# Patient Record
Sex: MALE | Race: BLACK OR AFRICAN AMERICAN | Employment: OTHER | URBAN - METROPOLITAN AREA
[De-identification: names, ages, dates, MRNs, and addresses within clinical notes are randomized per-mention and may not be internally consistent; named-entity substitution may affect disease eponyms.]

---

## 2017-01-10 ENCOUNTER — OFFICE VISIT (OUTPATIENT)
Dept: BEHAVIORAL/MENTAL HEALTH CLINIC | Age: 62
End: 2017-01-10

## 2017-01-10 VITALS
WEIGHT: 225 LBS | HEART RATE: 69 BPM | SYSTOLIC BLOOD PRESSURE: 161 MMHG | DIASTOLIC BLOOD PRESSURE: 73 MMHG | HEIGHT: 74 IN | BODY MASS INDEX: 28.88 KG/M2

## 2017-01-10 DIAGNOSIS — F98.8 ADD (ATTENTION DEFICIT DISORDER): Primary | Chronic | ICD-10-CM

## 2017-01-10 RX ORDER — ATOMOXETINE 40 MG/1
40 CAPSULE ORAL DAILY
Qty: 30 CAP | Refills: 1 | Status: SHIPPED | OUTPATIENT
Start: 2017-01-10 | End: 2017-02-07 | Stop reason: SDUPTHER

## 2017-01-10 NOTE — MR AVS SNAPSHOT
Visit Information Date & Time Provider Department Dept. Phone Encounter #  
 1/10/2017  4:00 PM Kang Macias, [facility] [phone] [id number] Upcoming Health Maintenance Date Due Hepatitis C Screening 1955 LIPID PANEL Q1 1955 FOOT EXAM Q1 9/29/1966 MICROALBUMIN Q1 9/29/1966 EYE EXAM RETINAL OR DILATED Q1 9/29/1966 Pneumococcal 19-64 Medium Risk (1 of 1 - PPSV23) 9/29/1975 DTaP/Tdap/Td series (1 - Tdap) 9/29/1977 FOBT Q 1 YEAR AGE 50-75 9/29/2006 HEMOGLOBIN A1C Q6M 1/27/2016 INFLUENZA AGE 9 TO ADULT 8/1/2016 ZOSTER VACCINE AGE 60> 9/29/2016 Allergies as of 1/10/2017  Review Complete On: 1/10/2017 By: Ileene Schwab No Known Allergies Current Immunizations  Never Reviewed No immunizations on file. Not reviewed this visit You Were Diagnosed With   
  
 Codes Comments ADD (attention deficit disorder)    -  Primary ICD-10-CM: F98.8 ICD-9-CM: 314.00 Vitals BP Pulse Height(growth percentile) Weight(growth percentile) BMI Smoking Status 161/73 (BP 1 Location: Left arm) 69 6' 2\" (1.88 m) 225 lb (102.1 kg) 28.89 kg/m2 Never Smoker Vitals History BMI and BSA Data Body Mass Index Body Surface Area  
 28.89 kg/m 2 2.31 m 2 Preferred Pharmacy Pharmacy Name Phone Aravind [address], [address] AT [facility] OF  [facility] [phone] Your Updated Medication List  
  
   
This list is accurate as of: 1/10/17  4:09 PM.  Always use your most recent med list.  
  
  
  
  
 ACCU-CHEK LIANNE PLUS TEST STRP strip Generic drug:  glucose blood VI test strips  
  
 atomoxetine 40 mg capsule Commonly known as:  STRATTERA Take 1 Cap by mouth daily. atorvastatin 10 mg tablet Commonly known as:  LIPITOR HYDROcodone-acetaminophen 5-325 mg per tablet Commonly known as:  Yenny Mura  
 Take 1 Tab by mouth every six (6) hours as needed for Pain. Max Daily Amount: 4 Tabs. ibuprofen 600 mg tablet Commonly known as:  MOTRIN Take 1 Tab by mouth every eight (8) hours. insulin aspart 100 unit/mL injection Commonly known as:  NOVOLOG  
4 Units by SubCUTAneous route Before breakfast, lunch, and dinner. insulin glargine 300 unit/mL (1.5 mL) Inpn 20 Units by SubCUTAneous route nightly. lisinopril 10 mg tablet Commonly known as:  PRINIVIL, ZESTRIL  
20 mg.  
  
  
  
  
Prescriptions Sent to Pharmacy Refills  
 atomoxetine (STRATTERA) 40 mg capsule 1 Sig: Take 1 Cap by mouth daily. Class: Normal  
 [facility] [address] [place] [address] #: [phone] Route: Oral  
  
Introducing [facility] & HEALTH SERVICES! Sheridan Fitzpatrick introduces [employer] patient portal. Now you can access parts of your medical record, email your doctor's office, and request medication refills online. 1. In your internet browser, go to [email or web address] 2. Click on the First Time User? Click Here link in the Sign In box. You will see the New Member Sign Up page. 3. Enter your [employer] Access Code exactly as it appears below. You will not need to use this code after youve completed the sign-up process. If you do not sign up before the expiration date, you must request a new code. · [employer] Access Code: [email or web address] Expires: 3/29/2017  4:57 PM 
 
4. Enter the last four digits of your Social Security Number (xxxx) and Date of Birth (mm/dd/yyyy) as indicated and click Submit. You will be taken to the next sign-up page. 5. Create a ALLO Communicationst ID. This will be your [employer] login ID and cannot be changed, so think of one that is secure and easy to remember. 6. Create a [employer] password. You can change your password at any time. 7. Enter your Password Reset Question and Answer.  This can be used at a later time if you forget your password. 8. Enter your e-mail address. You will receive e-mail notification when new information is available in [address] 9. Click Sign Up. You can now view and download portions of your medical record. 10. Click the Download Summary menu link to download a portable copy of your medical information. If you have questions, please visit the Frequently Asked Questions section of the [employer] website. Remember, [employer] is NOT to be used for urgent needs. For medical emergencies, dial 911. Now available from your iPhone and Android! Please provide this summary of care documentation to your next provider. Your primary care clinician is listed as [facility]. If you have any questions after today's visit, please call [phone].

## 2017-01-12 NOTE — PROGRESS NOTES
Psychiatric Outpatient Progress Note    Date: 1/11/2017  Account Number:  [id number]  Name: Leonette Essex    Length of psychotherapy session: 20 minutes       SUBJECTIVE:   Leonette Essex  is a 62 y.o.  male  patient presents for a therapy/psychopharmacological management appointment. Pt reports stable mood overall but states he does not feel like concentration is better. Patient denies SI/HI/SIB. No evidence of AH/VH or delusions. ROS:   Appetite:good , Sleep: improved     Response to Treatment:  restarting  Side Effects: none      Supportive/Cognitive/Reality-Oriented psychotherapy provided in regards to psychosocial stressors:   Current problems   Occupational issues   Medical issues   Interpersonal conflicts  Psychoeducation provided  Treatment plan reviewed with patient-including diagnosis and medications    OBJECTIVE:                 Mental Status exam: WNL except for      Sensorium  oriented to time, place and person   Relations cooperative    Eye Contact    appropriate   Appearance:  casually dressed, on crutches wearing ortho boot and splint   Motor Behavior:  amb with crutches   Speech:  normal pitch and normal volume   Thought Process: goal directed   Thought Content free of delusions and free of hallucinations   Suicidal ideations none   Homicidal ideations none   Mood:  euthymic   Affect:  euthymic   Memory recent  adequate   Memory remote:  adequate   Concentration:  adequate   Abstraction:  abstract   Insight:  good   Reliability good   Judgment:  good       No Known Allergies     Current Outpatient Prescriptions   Medication Sig Dispense Refill    atomoxetine (STRATTERA) 40 mg capsule Take 1 Cap by mouth daily. 30 Cap 1    HYDROcodone-acetaminophen (NORCO) 5-325 mg per tablet Take 1 Tab by mouth every six (6) hours as needed for Pain. Max Daily Amount: 4 Tabs. 17 Tab 0    ibuprofen (MOTRIN) 600 mg tablet Take 1 Tab by mouth every eight (8) hours.  20 Tab 0    insulin aspart (NOVOLOG) 100 unit/mL injection 4 Units by SubCUTAneous route Before breakfast, lunch, and dinner.  insulin glargine 300 unit/mL (1.5 mL) inpn 20 Units by SubCUTAneous route nightly.  atorvastatin (LIPITOR) 10 mg tablet   2    lisinopril (PRINIVIL, ZESTRIL) 10 mg tablet 20 mg.  2    ACCU-CHEK LIANNE PLUS TEST STRP strip   6        MEDICAL DECISION MAKING    Data: pertinent labs, imaging, medical records and diagnostic tests reviewed and incorporated in diagnosis and treatment plan    Diagnoses/ Problems:     ICD-10-CM ICD-9-CM    1. ADD (attention deficit disorder) F98.8 314.00 atomoxetine (STRATTERA) 40 mg capsule       3. Medical:  Insulin-dependent diabetic, HTN, hypercholesteremia  4. Social:  relocation due to [address], financial, unemployment. Assessment:  Tres Winston  is a 62 y.o.  male patient presents with depression, anxiety and decreased concentration. Anxiety about wife's illness and recent hospitalization. Had own ortho injury and can not work for several weeks. Denies SI. Risk Scoring- chronic illnesses and prescription drug management    Treatment Plan:  1. Medications:      DC Vyvanse  Start Strattera    Orders Placed This Encounter    atomoxetine (STRATTERA) 40 mg capsule           The following regarding medications was addressed:    (The risks and benefits of the proposed medication; the potential medication side effects ie    dry mouth, weight gain, GI upset, headache; patient given opportunity to ask questions)       Medication Changes/Adjustments:   Medications Discontinued During This Encounter   Medication Reason    Lisdexamfetamine (VYVANSE) 40 mg capsule Alternate Therapy    Lisdexamfetamine (VYVANSE) 40 mg capsule Alternate Therapy    Lisdexamfetamine (VYVANSE) 40 mg capsule Alternate Therapy      2. Provided supportive psychotherapy: addressed safety risk, recent stressors, provided validation and assisted with coping skills and problem solving   -  3.    Follow-up Disposition:  Return in about 4 weeks (around 2/7/2017). PSYCHOTHERAPY:  approx 10 minutes  Type:  Supportive & Solution Focused psychotherapy provided  Focus:     Current problems  -work tasking, making lists   Housing issues   Occupational issues   Interpersonal conflicts  Psychoeducation provided  Treatment plan reviewed with patient-including diagnosis and medications    Torres Friend is progressing.

## 2017-02-07 ENCOUNTER — OFFICE VISIT (OUTPATIENT)
Dept: BEHAVIORAL/MENTAL HEALTH CLINIC | Age: 62
End: 2017-02-07

## 2017-02-07 DIAGNOSIS — F98.8 ADD (ATTENTION DEFICIT DISORDER): Chronic | ICD-10-CM

## 2017-02-07 RX ORDER — ATOMOXETINE 40 MG/1
40 CAPSULE ORAL DAILY
Qty: 30 CAP | Refills: 1 | Status: SHIPPED | OUTPATIENT
Start: 2017-02-07 | End: 2017-05-02 | Stop reason: SDUPTHER

## 2017-02-07 NOTE — PROGRESS NOTES
Psychiatric Outpatient Progress Note    Date: 2/20/2017  Account Number:  [id number]  Name: Ti Laguerre    Length of psychotherapy session: 20 minutes       SUBJECTIVE:   Ti Laguerre  is a 62 y.o.  male  patient presents for a therapy/psychopharmacological management appointment. Pt reports stable mood overall but had a good transition to strattera. More attentive. Able to finish tasks. Patient denies SI/HI/SIB. No evidence of AH/VH or delusions. ROS:   Appetite:good , Sleep: improved     Response to Treatment:  restarting  Side Effects: none      Supportive/Cognitive/Reality-Oriented psychotherapy provided in regards to psychosocial stressors:   Current problems   Occupational issues   Medical issues   Interpersonal conflicts  Psychoeducation provided  Treatment plan reviewed with patient-including diagnosis and medications    OBJECTIVE:                 Mental Status exam: WNL except for      Sensorium  oriented to time, place and person   Relations cooperative    Eye Contact    appropriate   Appearance:  casually dressed, on crutches wearing ortho boot and splint   Motor Behavior: In waking boot   Speech:  normal pitch and normal volume   Thought Process: goal directed   Thought Content free of delusions and free of hallucinations   Suicidal ideations none   Homicidal ideations none   Mood:  euthymic   Affect:  euthymic   Memory recent  adequate   Memory remote:  adequate   Concentration:  adequate   Abstraction:  abstract   Insight:  good   Reliability good   Judgment:  good       No Known Allergies     Current Outpatient Prescriptions   Medication Sig Dispense Refill    atomoxetine (STRATTERA) 40 mg capsule Take 1 Cap by mouth daily. 30 Cap 1    HYDROcodone-acetaminophen (NORCO) 5-325 mg per tablet Take 1 Tab by mouth every six (6) hours as needed for Pain. Max Daily Amount: 4 Tabs. 17 Tab 0    ibuprofen (MOTRIN) 600 mg tablet Take 1 Tab by mouth every eight (8) hours.  20 Tab 0    insulin aspart (NOVOLOG) 100 unit/mL injection 4 Units by SubCUTAneous route Before breakfast, lunch, and dinner.  insulin glargine 300 unit/mL (1.5 mL) inpn 20 Units by SubCUTAneous route nightly.  atorvastatin (LIPITOR) 10 mg tablet   2    lisinopril (PRINIVIL, ZESTRIL) 10 mg tablet 20 mg.  2    ACCU-CHEK LIANNE PLUS TEST STRP strip   6        MEDICAL DECISION MAKING    Data: pertinent labs, imaging, medical records and diagnostic tests reviewed and incorporated in diagnosis and treatment plan    Diagnoses/ Problems:     ICD-10-CM ICD-9-CM    1. ADD (attention deficit disorder) F98.8 314.00 atomoxetine (STRATTERA) 40 mg capsule       3. Medical:  Insulin-dependent diabetic, HTN, hypercholesteremia  4. Social:  relocation due to [address], financial, unemployment. Assessment:  Judy Ewing  is a 62 y.o.  male patient presents with depression, anxiety and decreased concentration. Anxiety about family stressor and his orthopaedic injuries. Good change from vyvanse to strattera. Better concentration and able to complete work tasks. Continues to have frustration about family not meeting his expectations. Denies SI. Risk Scoring- chronic illnesses and prescription drug management    Treatment Plan:  1. Medications:     continue Strattera    Orders Placed This Encounter    atomoxetine (STRATTERA) 40 mg capsule           The following regarding medications was addressed:    (The risks and benefits of the proposed medication; the potential medication side effects ie    dry mouth, weight gain, GI upset, headache; patient given opportunity to ask questions)       Medication Changes/Adjustments:   Medications Discontinued During This Encounter   Medication Reason    atomoxetine (STRATTERA) 40 mg capsule Reorder      2. Provided supportive psychotherapy: addressed safety risk, recent stressors, provided validation and assisted with coping skills and problem solving   -  3.    Follow-up Disposition:  Return in about 3 months (around 5/7/2017). PSYCHOTHERAPY:  approx 10 minutes  Type:  Supportive & Solution Focused psychotherapy provided  Focus:     Current problems  -work tasking, making lists   Housing issues   Occupational issues   Interpersonal conflicts  Psychoeducation provided  Treatment plan reviewed with patient-including diagnosis and medications    Refugia Locus is progressing.

## 2017-02-07 NOTE — MR AVS SNAPSHOT
Visit Information Date & Time Provider Department Dept. Phone Encounter #  
 2/7/2017  3:00 PM Kang Macias, [address] [facility] [phone] [id number] Follow-up Instructions Return in about 3 months (around 5/7/2017). Upcoming Health Maintenance Date Due Hepatitis C Screening 1955 LIPID PANEL Q1 1955 FOOT EXAM Q1 9/29/1966 MICROALBUMIN Q1 9/29/1966 EYE EXAM RETINAL OR DILATED Q1 9/29/1966 Pneumococcal 19-64 Medium Risk (1 of 1 - PPSV23) 9/29/1975 DTaP/Tdap/Td series (1 - Tdap) 9/29/1977 FOBT Q 1 YEAR AGE 50-75 9/29/2006 HEMOGLOBIN A1C Q6M 1/27/2016 INFLUENZA AGE 9 TO ADULT 8/1/2016 ZOSTER VACCINE AGE 60> 9/29/2016 Allergies as of 2/7/2017  Review Complete On: 2/7/2017 By: Kang Macias NP No Known Allergies Current Immunizations  Never Reviewed No immunizations on file. Not reviewed this visit You Were Diagnosed With   
  
 Codes Comments ADD (attention deficit disorder)     ICD-10-CM: F98.8 ICD-9-CM: 314.00 Vitals Smoking Status Never Smoker Preferred Pharmacy Pharmacy Name Phone Aravind [address], [address] AT [facility] OF  [facility] [phone] Your Updated Medication List  
  
   
This list is accurate as of: 2/7/17  3:30 PM.  Always use your most recent med list.  
  
  
  
  
 ACCU-CHEK LIANNE PLUS TEST STRP strip Generic drug:  glucose blood VI test strips  
  
 atomoxetine 40 mg capsule Commonly known as:  STRATTERA Take 1 Cap by mouth daily. atorvastatin 10 mg tablet Commonly known as:  LIPITOR HYDROcodone-acetaminophen 5-325 mg per tablet Commonly known as:  Yenny Mura Take 1 Tab by mouth every six (6) hours as needed for Pain. Max Daily Amount: 4 Tabs. ibuprofen 600 mg tablet Commonly known as:  MOTRIN Take 1 Tab by mouth every eight (8) hours. insulin aspart 100 unit/mL injection Commonly known as:  NOVOLOG  
4 Units by SubCUTAneous route Before breakfast, lunch, and dinner. insulin glargine 300 unit/mL (1.5 mL) Inpn 20 Units by SubCUTAneous route nightly. lisinopril 10 mg tablet Commonly known as:  PRINIVIL, ZESTRIL  
20 mg.  
  
  
  
  
Prescriptions Sent to Pharmacy Refills  
 atomoxetine (STRATTERA) 40 mg capsule 1 Sig: Take 1 Cap by mouth daily. Class: Normal  
 Pharmacy: [facility] [place], [address] [facility] #: [phone] Route: Oral  
  
Follow-up Instructions Return in about 3 months (around 5/7/2017). Introducing [facility] & [facility] SERVICES! Daphnie Acosta introduces [employer] patient portal. Now you can access parts of your medical record, email your doctor's office, and request medication refills online. 1. In your internet browser, go to [email or web address] 2. Click on the First Time User? Click Here link in the Sign In box. You will see the New Member Sign Up page. 3. Enter your [employer] Access Code exactly as it appears below. You will not need to use this code after youve completed the sign-up process. If you do not sign up before the expiration date, you must request a new code. · [employer] Access Code: [email or web address] Expires: 3/29/2017  4:57 PM 
 
4. Enter the last four digits of your Social Security Number (xxxx) and Date of Birth (mm/dd/yyyy) as indicated and click Submit. You will be taken to the next sign-up page. 5. Create a [employer]A Motor[employer] ID. This will be your [employer] login ID and cannot be changed, so think of one that is secure and easy to remember. 6. Create a [employer] password. You can change your password at any time. 7. Enter your Password Reset Question and Answer. This can be used at a later time if you forget your password. 8. Enter your e-mail address.  You will receive e-mail notification when new information is available in [employer]. 9. Click Sign Up. You can now view and download portions of your medical record. 10. Click the Download Summary menu link to download a portable copy of your medical information. If you have questions, please visit the Frequently Asked Questions section of the [employer] website. Remember, [employer] is NOT to be used for urgent needs. For medical emergencies, dial 911. Now available from your iPhone and Android! Please provide this summary of care documentation to your next provider. Your primary care clinician is listed as Therese Melgar. If you have any questions after today's visit, please call [phone].

## 2017-05-02 ENCOUNTER — OFFICE VISIT (OUTPATIENT)
Dept: BEHAVIORAL/MENTAL HEALTH CLINIC | Age: 62
End: 2017-05-02

## 2017-05-02 VITALS
HEIGHT: 74 IN | WEIGHT: 212 LBS | DIASTOLIC BLOOD PRESSURE: 80 MMHG | BODY MASS INDEX: 27.21 KG/M2 | SYSTOLIC BLOOD PRESSURE: 148 MMHG | HEART RATE: 84 BPM

## 2017-05-02 DIAGNOSIS — F98.8 ADD (ATTENTION DEFICIT DISORDER): Primary | Chronic | ICD-10-CM

## 2017-05-02 RX ORDER — INSULIN ASPART 100 [IU]/ML
INJECTION, SOLUTION INTRAVENOUS; SUBCUTANEOUS
Refills: 1 | COMMUNITY
Start: 2017-03-30 | End: 2022-03-10

## 2017-05-02 RX ORDER — ATOMOXETINE 60 MG/1
60 CAPSULE ORAL DAILY
Qty: 30 CAP | Refills: 2 | Status: SHIPPED | OUTPATIENT
Start: 2017-05-02 | End: 2017-07-13 | Stop reason: SDUPTHER

## 2017-05-02 NOTE — MR AVS SNAPSHOT
Visit Information Date & Time Provider Department Dept. Phone Encounter #  
 5/2/2017  3:30 PM Dmitriy Gunderson, [address] [facility] [phone] [id number] Upcoming Health Maintenance Date Due Hepatitis C Screening 1955 LIPID PANEL Q1 1955 FOOT EXAM Q1 9/29/1966 MICROALBUMIN Q1 9/29/1966 EYE EXAM RETINAL OR DILATED Q1 9/29/1966 Pneumococcal 19-64 Medium Risk (1 of 1 - PPSV23) 9/29/1975 DTaP/Tdap/Td series (1 - Tdap) 9/29/1977 FOBT Q 1 YEAR AGE 50-75 9/29/2006 HEMOGLOBIN A1C Q6M 1/27/2016 ZOSTER VACCINE AGE 60> 9/29/2016 INFLUENZA AGE 9 TO ADULT 8/1/2017 Allergies as of 5/2/2017  Review Complete On: 5/2/2017 By: Yovany Carrasco No Known Allergies Current Immunizations  Never Reviewed No immunizations on file. Not reviewed this visit You Were Diagnosed With   
  
 Codes Comments ADD (attention deficit disorder)    -  Primary ICD-10-CM: F98.8 ICD-9-CM: 314.00 Vitals BP Pulse Height(growth percentile) Weight(growth percentile) BMI Smoking Status 148/80 84 6' 2\" (1.88 m) 212 lb (96.2 kg) 27.22 kg/m2 Never Smoker Vitals History BMI and BSA Data Body Mass Index Body Surface Area  
 27.22 kg/m 2 2.24 m 2 Preferred Pharmacy Pharmacy Name Phone Aravind Billings[address], [address] AT [facility] OF  [facility] [phone] Your Updated Medication List  
  
   
This list is accurate as of: 5/2/17  3:49 PM.  Always use your most recent med list.  
  
  
  
  
 ACCU-CHEK LIANNE PLUS TEST STRP strip Generic drug:  glucose blood VI test strips  
  
 atomoxetine 60 mg capsule Commonly known as:  STRATTERA Take 1 Cap by mouth daily. atorvastatin 10 mg tablet Commonly known as:  LIPITOR HYDROcodone-acetaminophen 5-325 mg per tablet Commonly known as:  Gavino Ugarte  
 Take 1 Tab by mouth every six (6) hours as needed for Pain. Max Daily Amount: 4 Tabs. ibuprofen 600 mg tablet Commonly known as:  MOTRIN Take 1 Tab by mouth every eight (8) hours. * insulin aspart 100 unit/mL injection Commonly known as:  NOVOLOG  
4 Units by SubCUTAneous route Before breakfast, lunch, and dinner. * NovoLOG Flexpen 100 unit/mL Inpn Generic drug:  insulin aspart INJECT 4 UNITS SC FOR MEALS IF NOT TAKING HUMALOG  
  
 insulin glargine 300 unit/mL (1.5 mL) Inpn 20 Units by SubCUTAneous route nightly. lisinopril 10 mg tablet Commonly known as:  PRINIVIL, ZESTRIL  
20 mg.  
  
 * Notice: This list has 2 medication(s) that are the same as other medications prescribed for you. Read the directions carefully, and ask your doctor or other care provider to review them with you. Prescriptions Sent to Pharmacy Refills  
 atomoxetine (STRATTERA) 60 mg capsule 2 Sig: Take 1 Cap by mouth daily. Class: Normal  
 Pharmacy: [facility] [place], [address] [place] [address] #: [phone] Route: Oral  
  
Introducing [facility] & HEALTH SERVICES! [place] [employer] introduces [employer] patient portal. Now you can access parts of your medical record, email your doctor's office, and request medication refills online. 1. In your internet browser, go to [email or web address] 2. Click on the First Time User? Click Here link in the Sign In box. You will see the New Member Sign Up page. 3. Enter your [employer] Access Code exactly as it appears below. You will not need to use this code after youve completed the sign-up process. If you do not sign up before the expiration date, you must request a new code. · [employer] Access Code: [email or web address] Expires: 7/31/2017  3:49 PM 
 
4.  Enter the last four digits of your Social Security Number (xxxx) and Date of Birth (mm/dd/yyyy) as indicated and click Submit. You will be taken to the next sign-up page. 5. Create a [employer] Found! ID. This will be your [employer] login ID and cannot be changed, so think of one that is secure and easy to remember. 6. Create a [employer] Found! password. You can change your password at any time. 7. Enter your Password Reset Question and Answer. This can be used at a later time if you forget your password. 8. Enter your e-mail address. You will receive e-mail notification when new information is available in [address] 9. Click Sign Up. You can now view and download portions of your medical record. 10. Click the Download Summary menu link to download a portable copy of your medical information. If you have questions, please visit the Frequently Asked Questions section of the [employer] website. Remember, [employer] is NOT to be used for urgent needs. For medical emergencies, dial 911. Now available from your iPhone and Android! Please provide this summary of care documentation to your next provider. Your primary care clinician is listed as Gamal Morel. If you have any questions after today's visit, please call [phone].

## 2017-05-02 NOTE — PROGRESS NOTES
Psychiatric Outpatient Progress Note    Date: 5/4/2017  Account Number:  [id number]  Name: Sharath Syed    Length of psychotherapy session: 20 minutes     SUBJECTIVE:   Sharath Syed  is a 62 y.o.  male  patient presents for a therapy/psychopharmacological management appointment. Pt reports stable mood overall but had a good transition to strattera. Feels like he has decreased concentration and is forgetful at times when easily distracted. Stressors: wife home from rehab s/p surgery. Patient denies SI/HI/SIB. No evidence of AH/VH or delusions. ROS:   Appetite:good , Sleep: improved     Response to Treatment:  restarting  Side Effects: none      Supportive/Cognitive/Reality-Oriented psychotherapy provided in regards to psychosocial stressors:   Current problems   Occupational issues   Medical issues   Interpersonal conflicts  Psychoeducation provided  Treatment plan reviewed with patient-including diagnosis and medications    OBJECTIVE:                 Mental Status exam: WNL except for      Sensorium  oriented to time, place and person   Relations cooperative    Eye Contact    appropriate   Appearance:  casually dressed, on crutches wearing ortho boot and splint   Motor Behavior: In waking boot   Speech:  normal pitch and normal volume   Thought Process: goal directed   Thought Content free of delusions and free of hallucinations   Suicidal ideations none   Homicidal ideations none   Mood:  euthymic   Affect:  euthymic   Memory recent  adequate   Memory remote:  adequate   Concentration:  adequate   Abstraction:  abstract   Insight:  good   Reliability good   Judgment:  good       No Known Allergies     Current Outpatient Prescriptions   Medication Sig Dispense Refill    NOVOLOG FLEXPEN 100 unit/mL inpn INJECT 4 UNITS SC FOR MEALS IF NOT TAKING HUMALOG  1    atomoxetine (STRATTERA) 60 mg capsule Take 1 Cap by mouth daily.  30 Cap 2    insulin aspart (NOVOLOG) 100 unit/mL injection 4 Units by SubCUTAneous route Before breakfast, lunch, and dinner.  atorvastatin (LIPITOR) 10 mg tablet   2    lisinopril (PRINIVIL, ZESTRIL) 10 mg tablet 20 mg.  2    ACCU-CHEK LIANNE PLUS TEST STRP strip   6    HYDROcodone-acetaminophen (NORCO) 5-325 mg per tablet Take 1 Tab by mouth every six (6) hours as needed for Pain. Max Daily Amount: 4 Tabs. 17 Tab 0    ibuprofen (MOTRIN) 600 mg tablet Take 1 Tab by mouth every eight (8) hours. 20 Tab 0    insulin glargine 300 unit/mL (1.5 mL) inpn 20 Units by SubCUTAneous route nightly. MEDICAL DECISION MAKING    Data: pertinent labs, imaging, medical records and diagnostic tests reviewed and incorporated in diagnosis and treatment plan    Diagnoses/ Problems:     ICD-10-CM ICD-9-CM    1. ADD (attention deficit disorder) F98.8 314.00 atomoxetine (STRATTERA) 60 mg capsule       3. Medical:  Insulin-dependent diabetic, HTN, hypercholesteremia  4. Social:  relocation due to [address] [place] [address] Street, financial, unemployment. Assessment:  Antonieta Young  is a 62 y.o.  male patient presents with depression, anxiety and decreased concentration. Anxiety about family stressor and his orthopaedic injuries. Good change from vyvanse to strattera. Concentration decreased and forgetful. Continues to have frustration about family not meeting his expectations. Denies SI. Risk Scoring- chronic illnesses and prescription drug management    Treatment Plan:  1.   Medications:     Increase Strattera 60 mg    Orders Placed This Encounter    NOVOLOG FLEXPEN 100 unit/mL inpn    atomoxetine (STRATTERA) 60 mg capsule           The following regarding medications was addressed:    (The risks and benefits of the proposed medication; the potential medication side effects ie    dry mouth, weight gain, GI upset, headache; patient given opportunity to ask questions)       Medication Changes/Adjustments:   Medications Discontinued During This Encounter   Medication Reason    atomoxetine (STRATTERA) 40 mg capsule Reorder      2. Provided supportive psychotherapy: addressed safety risk, recent stressors, provided validation and assisted with coping skills and problem solving   -  3. Follow-up Disposition:  Return in about 3 months (around 8/2/2017). PSYCHOTHERAPY:  approx 10 minutes  Type:  Supportive & Solution Focused psychotherapy provided  Focus:     Current problems  -work tasking, making lists   Housing issues   Occupational issues   Interpersonal conflicts  Psychoeducation provided  Treatment plan reviewed with patient-including diagnosis and medications    Jules Number is progressing.     Signed By: Ying Lima NP     May 4, 2017

## 2017-07-13 ENCOUNTER — OFFICE VISIT (OUTPATIENT)
Dept: BEHAVIORAL/MENTAL HEALTH CLINIC | Age: 62
End: 2017-07-13

## 2017-07-13 VITALS
HEIGHT: 74 IN | HEART RATE: 81 BPM | BODY MASS INDEX: 27.08 KG/M2 | WEIGHT: 211 LBS | DIASTOLIC BLOOD PRESSURE: 91 MMHG | SYSTOLIC BLOOD PRESSURE: 159 MMHG

## 2017-07-13 DIAGNOSIS — F98.8 ADD (ATTENTION DEFICIT DISORDER): Primary | Chronic | ICD-10-CM

## 2017-07-13 RX ORDER — ATOMOXETINE 60 MG/1
60 CAPSULE ORAL DAILY
Qty: 30 CAP | Refills: 2 | Status: SHIPPED | OUTPATIENT
Start: 2017-07-13 | End: 2017-10-26 | Stop reason: SDUPTHER

## 2017-07-13 NOTE — MR AVS SNAPSHOT
Visit Information Date & Time Provider Department Dept. Phone Encounter #  
 7/13/2017  3:00 PM [place]rayan Davis, [address] [place] [phone] [id number] Upcoming Health Maintenance Date Due Hepatitis C Screening 1955 LIPID PANEL Q1 1955 FOOT EXAM Q1 9/29/1966 MICROALBUMIN Q1 9/29/1966 EYE EXAM RETINAL OR DILATED Q1 9/29/1966 Pneumococcal 19-64 Medium Risk (1 of 1 - PPSV23) 9/29/1975 DTaP/Tdap/Td series (1 - Tdap) 9/29/1977 FOBT Q 1 YEAR AGE 50-75 9/29/2006 HEMOGLOBIN A1C Q6M 1/27/2016 ZOSTER VACCINE AGE 60> 9/29/2016 INFLUENZA AGE 9 TO ADULT 8/1/2017 Allergies as of 7/13/2017  Review Complete On: 7/13/2017 By: Manda Cervantes No Known Allergies Current Immunizations  Never Reviewed No immunizations on file. Not reviewed this visit You Were Diagnosed With   
  
 Codes Comments ADD (attention deficit disorder)    -  Primary ICD-10-CM: F98.8 ICD-9-CM: 314.00 Vitals BP Pulse Height(growth percentile) Weight(growth percentile) BMI Smoking Status (!) 159/91 81 6' 2\" (1.88 m) 211 lb (95.7 kg) 27.09 kg/m2 Never Smoker Vitals History BMI and BSA Data Body Mass Index Body Surface Area  
 27.09 kg/m 2 2.24 m 2 Preferred Pharmacy Pharmacy Name Phone [facility], [address] AT [facility] OF  [facility] [phone] Your Updated Medication List  
  
   
This list is accurate as of: 7/13/17  3:20 PM.  Always use your most recent med list.  
  
  
  
  
 ACCU-CHEK LIANNE PLUS TEST STRP strip Generic drug:  glucose blood VI test strips  
  
 atomoxetine 60 mg capsule Commonly known as:  STRATTERA Take 1 Cap by mouth daily. atorvastatin 10 mg tablet Commonly known as:  LIPITOR HYDROcodone-acetaminophen 5-325 mg per tablet Commonly known as:  Ronda Hadley  
 Take 1 Tab by mouth every six (6) hours as needed for Pain. Max Daily Amount: 4 Tabs. ibuprofen 600 mg tablet Commonly known as:  MOTRIN Take 1 Tab by mouth every eight (8) hours. * insulin aspart 100 unit/mL injection Commonly known as:  NOVOLOG  
4 Units by SubCUTAneous route Before breakfast, lunch, and dinner. * NovoLOG Flexpen 100 unit/mL Inpn Generic drug:  insulin aspart INJECT 4 UNITS SC FOR MEALS IF NOT TAKING HUMALOG  
  
 insulin glargine 300 unit/mL (1.5 mL) Inpn 20 Units by SubCUTAneous route nightly. lisinopril 10 mg tablet Commonly known as:  PRINIVIL, ZESTRIL  
20 mg.  
  
 * Notice: This list has 2 medication(s) that are the same as other medications prescribed for you. Read the directions carefully, and ask your doctor or other care provider to review them with you. Prescriptions Sent to Pharmacy Refills  
 atomoxetine (STRATTERA) 60 mg capsule 2 Sig: Take 1 Cap by mouth daily. Class: Normal  
 Pharmacy: [facility], [address] [place] [address] #: [phone] Route: Oral  
  
Introducing [facility]! Nati Briscoe introduces [email or web address] patient portal. Now you can access parts of your medical record, email your doctor's office, and request medication refills online. 1. In your internet browser, go to [email or web address] 2. Click on the First Time User? Click Here link in the Sign In box. You will see the New Member Sign Up page. 3. Enter your [email or web address] Access Code exactly as it appears below. You will not need to use this code after youve completed the sign-up process. If you do not sign up before the expiration date, you must request a new code. · [email or web address] Access Code: [email or web address] Expires: 7/31/2017  3:49 PM 
 
4.  Enter the last four digits of your Social Security Number (xxxx) and Date of Birth (mm/dd/yyyy) as indicated and click Submit. You will be taken to the next sign-up page. 5. Create a [email or web address] ID. This will be your [email or web address] login ID and cannot be changed, so think of one that is secure and easy to remember. 6. Create a [email or web address] password. You can change your password at any time. 7. Enter your Password Reset Question and Answer. This can be used at a later time if you forget your password. 8. Enter your e-mail address. You will receive e-mail notification when new information is available in [address] 9. Click Sign Up. You can now view and download portions of your medical record. 10. Click the Download Summary menu link to download a portable copy of your medical information. If you have questions, please visit the Frequently Asked Questions section of the [email or web address] website. Remember, [email or web address] is NOT to be used for urgent needs. For medical emergencies, dial 911. Now available from your iPhone and Android! Please provide this summary of care documentation to your next provider. Your primary care clinician is listed as Gamal Morel. If you have any questions after today's visit, please call [phone].

## 2017-07-13 NOTE — PROGRESS NOTES
Psychiatric Outpatient Progress Note    Date: 7/13/2017  Account Number:  [id number]  Name: Hakeem Erickson    Length of psychotherapy session: 20 minutes     SUBJECTIVE:   Hakeem Erickson  is a 62 y.o.  male  patient presents for a therapy/psychopharmacological management appointment. Pt reports stable mood overall but had a good transition to strattera. Better concentration with dose increase. Stressors: hours cut at work. Patient denies SI/HI/SIB. No evidence of AH/VH or delusions. ROS:   Appetite:good , Sleep: improved     Response to Treatment:  restarting  Side Effects: none      Supportive/Cognitive/Reality-Oriented psychotherapy provided in regards to psychosocial stressors:   Current problems   Occupational issues   Medical issues   Interpersonal conflicts  Psychoeducation provided  Treatment plan reviewed with patient-including diagnosis and medications    OBJECTIVE:                 Mental Status exam: WNL except for      Sensorium  oriented to time, place and person   Relations cooperative    Eye Contact    appropriate   Appearance:  casually dressed, on crutches wearing ortho boot and splint   Motor Behavior: In waking boot   Speech:  normal pitch and normal volume   Thought Process: goal directed   Thought Content free of delusions and free of hallucinations   Suicidal ideations none   Homicidal ideations none   Mood:  euthymic   Affect:  euthymic   Memory recent  adequate   Memory remote:  adequate   Concentration:  adequate   Abstraction:  abstract   Insight:  good   Reliability good   Judgment:  good       No Known Allergies     Current Outpatient Prescriptions   Medication Sig Dispense Refill    atomoxetine (STRATTERA) 60 mg capsule Take 1 Cap by mouth daily. 30 Cap 2    NOVOLOG FLEXPEN 100 unit/mL inpn INJECT 4 UNITS SC FOR MEALS IF NOT TAKING HUMALOG  1    insulin aspart (NOVOLOG) 100 unit/mL injection 4 Units by SubCUTAneous route Before breakfast, lunch, and dinner.       insulin glargine 300 unit/mL (1.5 mL) inpn 20 Units by SubCUTAneous route nightly.  atorvastatin (LIPITOR) 10 mg tablet   2    lisinopril (PRINIVIL, ZESTRIL) 10 mg tablet 20 mg.  2    ACCU-CHEK LIANNE PLUS TEST STRP strip   6    HYDROcodone-acetaminophen (NORCO) 5-325 mg per tablet Take 1 Tab by mouth every six (6) hours as needed for Pain. Max Daily Amount: 4 Tabs. 17 Tab 0    ibuprofen (MOTRIN) 600 mg tablet Take 1 Tab by mouth every eight (8) hours. 20 Tab 0        MEDICAL DECISION MAKING    Data: pertinent labs, imaging, medical records and diagnostic tests reviewed and incorporated in diagnosis and treatment plan    Diagnoses/ Problems:     ICD-10-CM ICD-9-CM    1. ADD (attention deficit disorder) F98.8 314.00 atomoxetine (STRATTERA) 60 mg capsule       3. Medical:  Insulin-dependent diabetic, HTN, hypercholesteremia  4. Social:  relocation due to [address], financial, unemployment. Assessment:  Gage Clarke  is a 62 y.o.  male patient presents with depression, anxiety and decreased concentration. Depression and anxiety improved. Better concentration with increased strattera. Has some work stressors financially as hours have been reduced. Denies SI. Risk Scoring- chronic illnesses and prescription drug management    Treatment Plan:  1. Medications:     Continue Strattera 60 mg    Orders Placed This Encounter    atomoxetine (STRATTERA) 60 mg capsule           The following regarding medications was addressed:    (The risks and benefits of the proposed medication; the potential medication side effects ie    dry mouth, weight gain, GI upset, headache; patient given opportunity to ask questions)       Medication Changes/Adjustments:   Medications Discontinued During This Encounter   Medication Reason    atomoxetine (STRATTERA) 60 mg capsule Reorder      2.   Provided supportive psychotherapy: addressed safety risk, recent stressors, provided validation and assisted with coping skills and problem solving   -  3. Follow-up Disposition:  Return in about 3 months (around 10/13/2017). PSYCHOTHERAPY:  approx 10 minutes  Type:  Supportive & Solution Focused psychotherapy provided  Focus:     Current problems  -work tasking, making lists   Housing issues   Occupational issues   Interpersonal conflicts  Psychoeducation provided  Treatment plan reviewed with patient-including diagnosis and medications    Thomas Castro is progressing.     Signed By: Danelle Portillo NP     July 13, 2017

## 2017-10-30 ENCOUNTER — HOSPITAL ENCOUNTER (EMERGENCY)
Age: 62
Discharge: HOME | End: 2017-10-30
Attending: EMERGENCY MEDICINE
Payer: COMMERCIAL

## 2017-10-30 VITALS
OXYGEN SATURATION: 94 % | BODY MASS INDEX: 26.82 KG/M2 | RESPIRATION RATE: 18 BRPM | WEIGHT: 209 LBS | HEIGHT: 74 IN | SYSTOLIC BLOOD PRESSURE: 126 MMHG | HEART RATE: 70 BPM | TEMPERATURE: 97.9 F | DIASTOLIC BLOOD PRESSURE: 61 MMHG

## 2017-10-30 DIAGNOSIS — E16.2 HYPOGLYCEMIA: Primary | ICD-10-CM

## 2017-10-30 LAB
ALBUMIN SERPL-MCNC: 4 G/DL (ref 3.5–5)
ALBUMIN/GLOB SERPL: 1 {RATIO} (ref 1.1–2.2)
ALP SERPL-CCNC: 65 U/L (ref 45–117)
ALT SERPL-CCNC: 47 U/L (ref 12–78)
ANION GAP SERPL CALC-SCNC: 5 MMOL/L (ref 5–15)
AST SERPL-CCNC: 30 U/L (ref 15–37)
BASOPHILS # BLD: 0 K/UL (ref 0–0.1)
BASOPHILS NFR BLD: 0 % (ref 0–1)
BILIRUB SERPL-MCNC: 0.5 MG/DL (ref 0.2–1)
BUN SERPL-MCNC: 15 MG/DL (ref 6–20)
BUN/CREAT SERPL: 9 (ref 12–20)
CALCIUM SERPL-MCNC: 8.8 MG/DL (ref 8.5–10.1)
CHLORIDE SERPL-SCNC: 102 MMOL/L (ref 97–108)
CO2 SERPL-SCNC: 31 MMOL/L (ref 21–32)
CREAT SERPL-MCNC: 1.73 MG/DL (ref 0.7–1.3)
EOSINOPHIL # BLD: 0.1 K/UL (ref 0–0.4)
EOSINOPHIL NFR BLD: 1 % (ref 0–7)
ERYTHROCYTE [DISTWIDTH] IN BLOOD BY AUTOMATED COUNT: 13.6 % (ref 11.5–14.5)
GLOBULIN SER CALC-MCNC: 3.9 G/DL (ref 2–4)
GLUCOSE BLD STRIP.AUTO-MCNC: 233 MG/DL (ref 65–100)
GLUCOSE BLD STRIP.AUTO-MCNC: 51 MG/DL (ref 65–100)
GLUCOSE BLD STRIP.AUTO-MCNC: 80 MG/DL (ref 65–100)
GLUCOSE BLD STRIP.AUTO-MCNC: 99 MG/DL (ref 65–100)
GLUCOSE SERPL-MCNC: 52 MG/DL (ref 65–100)
HCT VFR BLD AUTO: 50.7 % (ref 36.6–50.3)
HGB BLD-MCNC: 17 G/DL (ref 12.1–17)
LYMPHOCYTES # BLD: 1 K/UL (ref 0.8–3.5)
LYMPHOCYTES NFR BLD: 7 % (ref 12–49)
MCH RBC QN AUTO: 29.1 PG (ref 26–34)
MCHC RBC AUTO-ENTMCNC: 33.5 G/DL (ref 30–36.5)
MCV RBC AUTO: 86.8 FL (ref 80–99)
MONOCYTES # BLD: 1.5 K/UL (ref 0–1)
MONOCYTES NFR BLD: 11 % (ref 5–13)
NEUTS SEG # BLD: 11.5 K/UL (ref 1.8–8)
NEUTS SEG NFR BLD: 81 % (ref 32–75)
PLATELET # BLD AUTO: 186 K/UL (ref 150–400)
POTASSIUM SERPL-SCNC: 3.5 MMOL/L (ref 3.5–5.1)
PROT SERPL-MCNC: 7.9 G/DL (ref 6.4–8.2)
RBC # BLD AUTO: 5.84 M/UL (ref 4.1–5.7)
SERVICE CMNT-IMP: ABNORMAL
SERVICE CMNT-IMP: ABNORMAL
SERVICE CMNT-IMP: NORMAL
SERVICE CMNT-IMP: NORMAL
SODIUM SERPL-SCNC: 138 MMOL/L (ref 136–145)
WBC # BLD AUTO: 14.1 K/UL (ref 4.1–11.1)

## 2017-10-30 PROCEDURE — 99284 EMERGENCY DEPT VISIT MOD MDM: CPT

## 2017-10-30 PROCEDURE — 82962 GLUCOSE BLOOD TEST: CPT

## 2017-10-30 PROCEDURE — 80053 COMPREHEN METABOLIC PANEL: CPT | Performed by: EMERGENCY MEDICINE

## 2017-10-30 PROCEDURE — 36415 COLL VENOUS BLD VENIPUNCTURE: CPT | Performed by: EMERGENCY MEDICINE

## 2017-10-30 PROCEDURE — 85025 COMPLETE CBC W/AUTO DIFF WBC: CPT | Performed by: EMERGENCY MEDICINE

## 2017-10-30 NOTE — ED NOTES
HYPOGLYCEMIC EPISODE DOCUMENTATION    Patient with hypoglycemic episode(s) at 0530(time) on 10/30/2017(date). BG value(s) pre-treatment 46    Was patient symptomatic?  [] yes, [x] no  Patient was treated with the following rescue medications/treatments: [] D50                [] Glucose tablets                [] Glucagon                [x] 4oz juice                [] 6oz reg soda                [] 8oz low fat milk  BG value post-treatment: 80  Once BG treated and value greater than 80mg/dl, pt was provided with the following:  [] snack  [] meal  Name of MD notified:N/A  The following orders were received: N/A

## 2017-10-30 NOTE — ED PROVIDER NOTES
Patient is a 65 y.o. male presenting with hypoglycemia. Low Blood Sugar       65year old male with IDDM, presents with low blood sugar. Wife found him altered in bed, glucose checked and it was 30, gave him orange juice and it came up. He states he has been in his usual state of health. No fevers, chest pain or sob. No n/v/d. Ate normally yesterday. Went to bed late and doesn't think he ate a night time snack. BS was 100 at dinner, gave himself 4 units of novolog at 7pm.  Gave himself his 25 units of Lantus at midnight. Normally takes 4units of Novolog with each meal.  Feels fine now. No change in medications recently. No urinary symptoms. Past Medical History:   Diagnosis Date    Diabetes [facility])        Past Surgical History:   Procedure Laterality Date    HX TONSILLECTOMY           Family History:   Problem Relation Age of Onset    Cancer Mother      Breast    Cancer Father      Colon    Kidney Disease Brother     Diabetes Paternal Aunt     Sickle Cell Anemia Maternal Grandmother        Social History     Social History    Marital status:      Spouse name: N/A    Number of children: N/A    Years of education: N/A     Occupational History    Not on file. Social History Main Topics    Smoking status: Never Smoker    Smokeless tobacco: Never Used    Alcohol use No    Drug use: No    Sexual activity: Yes     Partners: Female     Other Topics Concern    Not on file     Social History Narrative         ALLERGIES: Review of patient's allergies indicates no known allergies. Review of Systems   Constitutional: Negative for fever. HENT: Negative for congestion. Eyes: Negative for visual disturbance. Respiratory: Negative for cough and shortness of breath. Cardiovascular: Negative for chest pain. Gastrointestinal: Negative for abdominal pain, nausea and vomiting. Endocrine: Negative for polyuria. Genitourinary: Negative for dysuria.    Musculoskeletal: Negative for gait problem. Skin: Negative for rash. Neurological: Negative for headaches. Psychiatric/Behavioral: Negative for dysphoric mood. Vitals:    10/30/17 0510   BP: 131/70   Pulse: 62   Resp: 16   Temp: 96.9 °F (36.1 °C)   SpO2: 98%   Weight: 94.8 kg (209 lb)   Height: 6' 2\" (1.88 m)            Physical Exam   Constitutional: He is oriented to person, place, and time. He appears well-developed and well-nourished. No distress. HENT:   Head: Normocephalic and atraumatic. Mouth/Throat: Oropharynx is clear and moist. No oropharyngeal exudate. Eyes: Conjunctivae and EOM are normal. Pupils are equal, round, and reactive to light. Right eye exhibits no discharge. Left eye exhibits no discharge. No scleral icterus. Neck: Normal range of motion. Neck supple. No JVD present. Cardiovascular: Normal rate, regular rhythm, normal heart sounds and intact distal pulses. Exam reveals no gallop and no friction rub. No murmur heard. Pulmonary/Chest: Effort normal and breath sounds normal. No stridor. No respiratory distress. He has no wheezes. He has no rales. He exhibits no tenderness. Abdominal: Soft. Bowel sounds are normal. He exhibits no distension and no mass. There is no tenderness. There is no rebound and no guarding. Musculoskeletal: Normal range of motion. He exhibits no edema or tenderness. Neurological: He is alert and oriented to person, place, and time. He has normal reflexes. No cranial nerve deficit. He exhibits normal muscle tone. Coordination normal.   Skin: Skin is warm and dry. No rash noted. No erythema. Psychiatric: He has a normal mood and affect. His behavior is normal. Judgment and thought content normal.        MDM  ED Course       Procedures    Blood sugars coming up. Will give meal and continue to monitor. Check basic labs. Sugars staying up. Labs ok. Cr 1.7 but no old labs to compare. Copy of labs given to patient.  Encouraged him to call his pcp today to review results and get further recommendations on any medication adjustments.

## 2017-10-30 NOTE — ED NOTES
MD reviewed discharge instructions with the patient. The patient verbalized understanding. Patient ambulated out of ED with son. No complaints or concerns noted.

## 2017-10-30 NOTE — DISCHARGE INSTRUCTIONS
Learning About Low Blood Sugar (Hypoglycemia) in Diabetes  What is low blood sugar (hypoglycemia)? Hypoglycemia means that your blood sugar is low and your body (especially your brain) is not getting enough fuel. If you have diabetes, your blood sugar can go too low if you take too much of some diabetes medicines. It can also go too low if you miss a meal. And it can happen if you exercise too hard without eating enough food. Some medicines used to treat other health problems can cause low blood sugar too. What are the symptoms? Symptoms of low blood sugar can start quickly. It may take just 10 to 15 minutes. If you have had diabetes for many years, you may not realize that your blood sugar is low until it drops very low. · If your blood sugar level drops below 70 (mild low blood sugar), you may feel tired, anxious, dizzy, weak, shaky, or sweaty. You may have a fast heartbeat or blurry vision. · If your blood sugar level continues to drop (usually below 40), your behavior may change. You may feel more irritable. You may find it hard to concentrate or talk. And you may feel unsteady when you stand or walk. You may become too weak or confused to eat something with sugar to raise your blood sugar level. · If your blood sugar level drops very low (usually below 20), you may pass out (lose consciousness). Or you may have a seizure or stroke. If you have symptoms of severe low blood sugar, you need to get medical care right away. If you had a low blood sugar level during the night, you may wake up tired or with a headache. Or you may sweat so much during the night that your pajamas or sheets are damp when you wake up. How is low blood sugar treated? You can treat low blood sugar by eating or drinking something that has 15 grams of carbohydrate. These should be quick-sugar foods.  Check your blood sugar level again 15 minutes after having a quick-sugar food to make sure your level is getting back to your target range. Here are examples of quick-sugar foods that have 15 grams of carbohydrate:  · 3 to 4 glucose tablets  · 1 tube of glucose gel  · Hard candy (such as 3 Jolly Ranchers or 5 to 7 Life Savers)  · 1 tablespoon honey  · 2 tablespoons of raisins  · ½ cup to ¾ cup (4 to 6 ounces) of fruit juice or regular (not diet) soda  · 1 tablespoon of sugar  · 1 cup of fat-free milk  If you have problems with severe low blood sugar, someone else may have to give you a shot of glucagon. This is a hormone that raises blood sugar levels quickly. How can you prevent low blood sugar? You can take steps to prevent low blood sugar. · Follow your treatment plan. Take your insulin or other diabetes medicine exactly as your doctor prescribed it. Talk with your doctor if you're having low blood sugar often. Your medicine may need to be adjusted if it's causing your low blood sugar. · Check your blood sugar levels often. This helps you find early changes before an emergency happens. · Keep a quick-sugar food with you in case your blood sugar level drops low. · Eat small meals more often so that you don't get too hungry between meals. Don't skip meals. · Balance extra exercise with eating more. Check your blood sugar and learn how it changes after exercise. If your blood sugar stays at a normal level, you may not need to eat after you exercise. · Limit how much alcohol you drink. Alcohol can make low blood sugar go even lower. Don't drink alcohol if you have problems recognizing the early signs of low blood sugar. · Keep a diary of your symptoms. This helps you learn when changes in your body may signal low blood sugar. And keep track of how often you have low blood sugar, including when you last ate and what you ate. This will help you learn what causes your blood sugar to drop. · Learn about diabetes and low blood sugar.  Support groups or a diabetes education center can help you understand how medicines, diet, and exercise affect your blood sugar levels. Since low blood sugar levels can quickly become an emergency, be sure to wear medical alert jewelry, such as a medical alert bracelet. This is to let people know you have diabetes so they can get help for you. You can buy this at most drugstores. And make sure your family, friends, and coworkers know the symptoms of low blood sugar. Teach them what to do to get your sugar level up. Follow-up care is a key part of your treatment and safety. Be sure to make and go to all appointments, and call your doctor if you are having problems. It's also a good idea to know your test results and keep a list of the medicines you take. Where can you learn more? Go to http://lucio-natalya.info/. Enter Z511 in the search box to learn more about \"Learning About Low Blood Sugar (Hypoglycemia) in Diabetes. \"  Current as of: March 13, 2017  Content Version: 11.4  © 7139-9968 [employer]. Care instructions adapted under license by [employer] (which disclaims liability or warranty for this information). If you have questions about a medical condition or this instruction, always ask your healthcare professional. [address] any warranty or liability for your use of this information. We hope that we have addressed all of your medical concerns. The examination and treatment you received in the Emergency Department were for an emergent problem and were not intended as complete care. It is important that you follow up with your healthcare provider(s) for ongoing care. If your symptoms worsen or do not improve as expected, and you are unable to reach your usual health care provider(s), you should return to the Emergency Department. Today's healthcare is undergoing tremendous change, and patient satisfaction surveys are one of the many tools to assess the quality of medical care.   You may receive a survey from the [facility] organization regarding your experience in the Emergency Department. I hope that your experience has been completely positive, particularly the medical care that I provided. As such, please participate in the survey; anything less than excellent does not meet my expectations or intentions. [facility] and [facility] participate in nationally recognized quality of care measures. If your blood pressure is greater than 120/80, as reported below, we urge that you seek medical care to address the potential of high blood pressure, commonly known as hypertension. Hypertension can be hereditary or can be caused by certain medical conditions, pain, stress, or \"white coat syndrome. \"       Please make an appointment with your health care provider(s) for follow up of your Emergency Department visit. VITALS:   Patient Vitals for the past 8 hrs:   Temp Pulse Resp BP SpO2   10/30/17 0630 - - - 131/66 93 %   10/30/17 0510 96.9 °F (36.1 °C) 62 16 131/70 98 %          Thank you for allowing us to provide you with medical care today. We realize that you have many choices for your emergency care needs. Please choose us in the future for any continued health care needs.       Lynnwood Gitelman, MD    [facility], [facility].   Office: [phone]            Recent Results (from the past 24 hour(s))   GLUCOSE, POC    Collection Time: 10/30/17  5:04 AM   Result Value Ref Range    Glucose (POC) 51 (L) 65 - 100 mg/dL    Performed by Mejia Gunderson    CBC WITH AUTOMATED DIFF    Collection Time: 10/30/17  5:15 AM   Result Value Ref Range    WBC 14.1 (H) 4.1 - 11.1 K/uL    RBC 5.84 (H) 4.10 - 5.70 M/uL    HGB 17.0 12.1 - 17.0 g/dL    HCT 50.7 (H) 36.6 - 50.3 %    MCV 86.8 80.0 - 99.0 FL    MCH 29.1 26.0 - 34.0 PG    MCHC 33.5 30.0 - 36.5 g/dL    RDW 13.6 11.5 - 14.5 %    PLATELET 511 134 - 397 K/uL    NEUTROPHILS 81 (H) 32 - 75 %    LYMPHOCYTES 7 (L) 12 - 49 %    MONOCYTES 11 5 - 13 %    EOSINOPHILS 1 0 - 7 %    BASOPHILS 0 0 - 1 %    ABS. NEUTROPHILS 11.5 (H) 1.8 - 8.0 K/UL    ABS. LYMPHOCYTES 1.0 0.8 - 3.5 K/UL    ABS. MONOCYTES 1.5 (H) 0.0 - 1.0 K/UL    ABS. EOSINOPHILS 0.1 0.0 - 0.4 K/UL    ABS. BASOPHILS 0.0 0.0 - 0.1 K/UL   METABOLIC PANEL, COMPREHENSIVE    Collection Time: 10/30/17  5:15 AM   Result Value Ref Range    Sodium 138 136 - 145 mmol/L    Potassium 3.5 3.5 - 5.1 mmol/L    Chloride 102 97 - 108 mmol/L    CO2 31 21 - 32 mmol/L    Anion gap 5 5 - 15 mmol/L    Glucose 52 (L) 65 - 100 mg/dL    BUN 15 6 - 20 MG/DL    Creatinine 1.73 (H) 0.70 - 1.30 MG/DL    BUN/Creatinine ratio 9 (L) 12 - 20      GFR est AA 49 (L) >60 ml/min/1.73m2    GFR est non-AA 40 (L) >60 ml/min/1.73m2    Calcium 8.8 8.5 - 10.1 MG/DL    Bilirubin, total 0.5 0.2 - 1.0 MG/DL    ALT (SGPT) 47 12 - 78 U/L    AST (SGOT) 30 15 - 37 U/L    Alk. phosphatase 65 45 - 117 U/L    Protein, total 7.9 6.4 - 8.2 g/dL    Albumin 4.0 3.5 - 5.0 g/dL    Globulin 3.9 2.0 - 4.0 g/dL    A-G Ratio 1.0 (L) 1.1 - 2.2     GLUCOSE, POC    Collection Time: 10/30/17  5:26 AM   Result Value Ref Range    Glucose (POC) 80 65 - 100 mg/dL    Performed by Alisia Christiansen, POC    Collection Time: 10/30/17  5:45 AM   Result Value Ref Range    Glucose (POC) 99 65 - 100 mg/dL    Performed by Sriram Cunningham (PCT)        No results found.

## 2017-10-30 NOTE — ED TRIAGE NOTES
Son reports he found pt lethargic and diaphoretic in his bed. Son checked blood sugar with results or 30. Pt was given approx 8 oz of orange juice with sugar increasing to 65. Pt states he does not remember incident and son state pt kept dropping cup of juice. Upon arrival to ER blood sugar was 51. Pt was given 8 oz of orange juice immediately.

## 2017-11-13 ENCOUNTER — OFFICE VISIT (OUTPATIENT)
Dept: BEHAVIORAL/MENTAL HEALTH CLINIC | Age: 62
End: 2017-11-13

## 2017-11-13 VITALS
HEIGHT: 74 IN | DIASTOLIC BLOOD PRESSURE: 83 MMHG | SYSTOLIC BLOOD PRESSURE: 145 MMHG | HEART RATE: 73 BPM | BODY MASS INDEX: 27.08 KG/M2 | WEIGHT: 211 LBS

## 2017-11-13 DIAGNOSIS — F98.8 ATTENTION DEFICIT DISORDER, UNSPECIFIED HYPERACTIVITY PRESENCE: Primary | Chronic | ICD-10-CM

## 2017-11-13 RX ORDER — ATOMOXETINE 80 MG/1
80 CAPSULE ORAL DAILY
Qty: 30 CAP | Refills: 2 | Status: SHIPPED | OUTPATIENT
Start: 2017-11-13 | End: 2020-10-14 | Stop reason: ALTCHOICE

## 2017-11-13 NOTE — MR AVS SNAPSHOT
Visit Information Date & Time Provider Department Dept. Phone Encounter #  
 11/13/2017 10:00 AM Zara Vitale, [facility] [phone] [id number] Upcoming Health Maintenance Date Due Hepatitis C Screening 1955 LIPID PANEL Q1 1955 FOOT EXAM Q1 9/29/1966 MICROALBUMIN Q1 9/29/1966 EYE EXAM RETINAL OR DILATED Q1 9/29/1966 Pneumococcal 19-64 Medium Risk (1 of 1 - PPSV23) 9/29/1975 DTaP/Tdap/Td series (1 - Tdap) 9/29/1977 FOBT Q 1 YEAR AGE 50-75 9/29/2006 HEMOGLOBIN A1C Q6M 1/27/2016 ZOSTER VACCINE AGE 60> 7/29/2016 Influenza Age 5 to Adult 8/1/2017 Allergies as of 11/13/2017  Review Complete On: 11/13/2017 By: Zara Vitale NP No Known Allergies Current Immunizations  Never Reviewed No immunizations on file. Not reviewed this visit You Were Diagnosed With   
  
 Codes Comments Attention deficit disorder, unspecified hyperactivity presence    -  Primary ICD-10-CM: F98.8 ICD-9-CM: 314.00 Vitals BP Pulse Height(growth percentile) Weight(growth percentile) BMI Smoking Status 145/83 73 6' 2\" (1.88 m) 211 lb (95.7 kg) 27.09 kg/m2 Never Smoker Vitals History BMI and BSA Data Body Mass Index Body Surface Area  
 27.09 kg/m 2 2.24 m 2 Preferred Pharmacy Pharmacy Name Phone [facility] [address] [facility]  [facility] [phone] Your Updated Medication List  
  
   
This list is accurate as of: 11/13/17 10:25 AM.  Always use your most recent med list.  
  
  
  
  
 ACCU-CHEK LIANNE PLUS TEST STRP strip Generic drug:  glucose blood VI test strips  
  
 atomoxetine 80 mg capsule Commonly known as:  STRATTERA Take 1 Cap by mouth daily. atorvastatin 10 mg tablet Commonly known as:  LIPITOR  
  
 insulin glargine 300 unit/mL (1.5 mL) Inpn 20 Units by SubCUTAneous route nightly. lisinopril 10 mg tablet Commonly known as:  PRINIVIL, ZESTRIL  
20 mg. NovoLOG Flexpen 100 unit/mL Inpn Generic drug:  insulin aspart INJECT 4 UNITS SC FOR MEALS IF NOT TAKING HUMALOG Prescriptions Sent to Pharmacy Refills  
 atomoxetine (STRATTERA) 80 mg capsule 2 Sig: Take 1 Cap by mouth daily. Class: Normal  
 [facility] [address] [facility] #: [phone] Route: Oral  
  
Introducing \[facility] [place] [facility]\"" & HEALTH SERVICES! Edin Zuhair introduces [employer] patient portal. Now you can access parts of your medical record, email your doctor's office, and request medication refills online. 1. In your internet browser, go to [email or web address]. [employer] 2. Click on the First Time User? Click Here link in the Sign In box. You will see the New Member Sign Up page. 3. Enter your [employer] Access Code exactly as it appears below. You will not need to use this code after youve completed the sign-up process. If you do not sign up before the expiration date, you must request a new code. · [employer] Access Code: [email or web address] Expires: 1/28/2018  6:07 AM 
 
4. Enter the last four digits of your Social Security Number (xxxx) and Date of Birth (mm/dd/yyyy) as indicated and click Submit. You will be taken to the next sign-up page. 5. Create a [employer] Employee [employer] Software ID. This will be your [employer] login ID and cannot be changed, so think of one that is secure and easy to remember. 6. Create a [employer] password. You can change your password at any time. 7. Enter your Password Reset Question and Answer. This can be used at a later time if you forget your password. 8. Enter your e-mail address. You will receive e-mail notification when new information is available in [address] 9. Click Sign Up. You can now view and download portions of your medical record. 10. Click the Download Summary menu link to download a portable copy of your medical information. If you have questions, please visit the Frequently Asked Questions section of the [employer] website. Remember, [employer] is NOT to be used for urgent needs. For medical emergencies, dial 911. Now available from your iPhone and Android! Please provide this summary of care documentation to your next provider. Your primary care clinician is listed as Charli Higuera. If you have any questions after today's visit, please call [phone].

## 2017-11-13 NOTE — PROGRESS NOTES
Psychiatric Outpatient Progress Note    Date: 11/13/2017  Account Number:  [id number]  Name: Edmundo Halsted    Length of psychotherapy session: 20 minutes     SUBJECTIVE:   Edmundo Halsted  is a 65 y.o.  male  patient presents for a therapy/psychopharmacological management appointment. Pt reports stable mood overall but feels like his concentration is weaning too early in the day. Finds himself forgetful. Patient denies SI/HI/SIB. No evidence of AH/VH or delusions. ROS:   Appetite:good , Sleep: improved     Response to Treatment: improving  Side Effects: none      Supportive/Cognitive/Reality-Oriented psychotherapy provided in regards to psychosocial stressors:   Current problems   Occupational issues   Medical issues   Interpersonal conflicts  Psychoeducation provided  Treatment plan reviewed with patient-including diagnosis and medications    OBJECTIVE:                 Mental Status exam: WNL except for      Sensorium  oriented to time, place and person   Relations cooperative    Eye Contact    appropriate   Appearance:  casually dressed   Motor Behavior:  wnl   Speech:  normal pitch and normal volume   Thought Process: goal directed   Thought Content free of delusions and free of hallucinations   Suicidal ideations none   Homicidal ideations none   Mood:  euthymic   Affect:  euthymic   Memory recent  adequate   Memory remote:  adequate   Concentration:  adequate   Abstraction:  abstract   Insight:  good   Reliability good   Judgment:  good       No Known Allergies     Current Outpatient Prescriptions   Medication Sig Dispense Refill    atomoxetine (STRATTERA) 80 mg capsule Take 1 Cap by mouth daily. 30 Cap 2    NOVOLOG FLEXPEN 100 unit/mL inpn INJECT 4 UNITS SC FOR MEALS IF NOT TAKING HUMALOG  1    insulin glargine 300 unit/mL (1.5 mL) inpn 20 Units by SubCUTAneous route nightly.       atorvastatin (LIPITOR) 10 mg tablet   2    lisinopril (PRINIVIL, ZESTRIL) 10 mg tablet 20 mg.  2    ACCU-CHEK LIANNE PLUS TEST STRP strip   6        MEDICAL DECISION MAKING    Data: pertinent labs, imaging, medical records and diagnostic tests reviewed and incorporated in diagnosis and treatment plan    Diagnoses/ Problems:     ICD-10-CM ICD-9-CM    1. Attention deficit disorder, unspecified hyperactivity presence F98.8 314.00 atomoxetine (STRATTERA) 80 mg capsule       3. Medical:  Insulin-dependent diabetic, HTN, hypercholesteremia  4. Social:  relocation due to [address], financial, unemployment. Assessment:  Lora Whitfield  is a 65 y.o.  male patient presents with improved depression, anxiety and decreased concentration. Depression and anxiety improved. Better concentration with strattera but feels it wears off too quickly. Denies SI. Risk Scoring- chronic illnesses and prescription drug management    Treatment Plan:  1. Medications:     Increase Strattera 80 mg    Orders Placed This Encounter    atomoxetine (STRATTERA) 80 mg capsule           The following regarding medications was addressed:    (The risks and benefits of the proposed medication; the potential medication side effects ie    dry mouth, weight gain, GI upset, headache; patient given opportunity to ask questions)       Medication Changes/Adjustments:   Medications Discontinued During This Encounter   Medication Reason    HYDROcodone-acetaminophen (NORCO) 5-325 mg per tablet Therapy Completed    ibuprofen (MOTRIN) 600 mg tablet Therapy Completed    insulin aspart (NOVOLOG) 100 unit/mL injection Discontinued by Another Clinician    atomoxetine (STRATTERA) 60 mg capsule Reorder      2. Provided supportive psychotherapy: addressed safety risk, recent stressors, provided validation and assisted with coping skills and problem solving   -  3. Follow-up Disposition:  Return in about 2 months (around 1/13/2018).      PSYCHOTHERAPY:  approx 10 minutes  Type:  Supportive & Solution Focused psychotherapy provided  Focus:     Current problems  -work tasking, making lists   Housing issues   Occupational issues   Interpersonal conflicts  Psychoeducation provided  Treatment plan reviewed with patient-including diagnosis and medications    Pebbles Newell is progressing.     Signed By: Prince Watters NP     November 13, 2017

## 2018-02-22 DIAGNOSIS — F98.8 ATTENTION DEFICIT DISORDER, UNSPECIFIED HYPERACTIVITY PRESENCE: Chronic | ICD-10-CM

## 2018-02-23 RX ORDER — ATOMOXETINE 80 MG/1
CAPSULE ORAL
Qty: 30 CAP | Refills: 0 | OUTPATIENT
Start: 2018-02-23

## 2018-03-03 DIAGNOSIS — F98.8 ATTENTION DEFICIT DISORDER, UNSPECIFIED HYPERACTIVITY PRESENCE: Chronic | ICD-10-CM

## 2018-03-05 RX ORDER — ATOMOXETINE 80 MG/1
CAPSULE ORAL
Qty: 30 CAP | Refills: 0 | OUTPATIENT
Start: 2018-03-05

## 2020-10-14 ENCOUNTER — OFFICE VISIT (OUTPATIENT)
Dept: UROLOGY | Age: 65
End: 2020-10-14
Payer: COMMERCIAL

## 2020-10-14 VITALS — TEMPERATURE: 97.2 F | HEIGHT: 74 IN | BODY MASS INDEX: 29 KG/M2 | WEIGHT: 226 LBS

## 2020-10-14 DIAGNOSIS — N50.819 PAIN IN TESTICLE, UNSPECIFIED LATERALITY: Primary | ICD-10-CM

## 2020-10-14 DIAGNOSIS — N40.0 BENIGN PROSTATIC HYPERPLASIA WITHOUT LOWER URINARY TRACT SYMPTOMS: ICD-10-CM

## 2020-10-14 DIAGNOSIS — N52.9 IMPOTENCE: ICD-10-CM

## 2020-10-14 LAB
BILIRUB UR QL STRIP: NEGATIVE
GLUCOSE UR-MCNC: NORMAL MG/DL
KETONES P FAST UR STRIP-MCNC: NEGATIVE MG/DL
PH UR STRIP: 7 [PH] (ref 4.6–8)
PROT UR QL STRIP: NORMAL
SP GR UR STRIP: 1.02 (ref 1–1.03)
UA UROBILINOGEN AMB POC: NORMAL (ref 0.2–1)
URINALYSIS CLARITY POC: CLEAR
URINALYSIS COLOR POC: YELLOW
URINE BLOOD POC: NEGATIVE
URINE LEUKOCYTES POC: NEGATIVE
URINE NITRITES POC: NEGATIVE

## 2020-10-14 PROCEDURE — 81003 URINALYSIS AUTO W/O SCOPE: CPT | Performed by: UROLOGY

## 2020-10-14 PROCEDURE — 99203 OFFICE O/P NEW LOW 30 MIN: CPT | Performed by: UROLOGY

## 2020-10-14 RX ORDER — METOPROLOL SUCCINATE 25 MG/1
50 TABLET, EXTENDED RELEASE ORAL DAILY
COMMUNITY
End: 2021-07-21

## 2020-10-14 NOTE — ASSESSMENT & PLAN NOTE
He likely has referred pains to the testicle. No primary inflammation or abnormality. He would like to see a chiropractor.

## 2020-10-14 NOTE — PROGRESS NOTES
HISTORY OF PRESENT ILLNESS  Damari Locke is a 60 y.o. male. Chief Complaint   Patient presents with    New Patient    Testicle Pain     He has left testicle pains. It is a pulling sensation with prolonged sitting. He has to adjust himself. He feels a dull ache. He thinks it is infrequent and intermittent. It is maybe 3 days per month. It does not last long. It bothers him 1-2/10. He denies back pain, but feels he needs a chiropracter. He finds he twists when he sits. He states he saw me over 3 years ago in 2017 for similar problems. We did not find a concern. He denies LUTS with normal flow. No hematuria, dysuria or bother. He denies stress. He is not working and lost his job as a . He is . He states he had a scrotal US which was normal.  [facility], ordered by his PCP. Problem List  Date Reviewed: 10/14/2020          Codes Class Noted    Pain in testicle ICD-10-CM: N50.819  ICD-9-CM: 608.9  10/14/2020    Overview Signed 10/14/2020 10:49 AM by Marvin Romberg, MD     Likely referred testis pain. Impotence ICD-10-CM: N52.9  ICD-9-CM: 607.84  10/14/2020    Overview Signed 10/14/2020 10:53 AM by Marvin Romberg, MD     He has trouble maintaining an erection. Sildenafil caused some congestion. He has tried an occlusive ring with minimal effect.                Type II or unspecified type diabetes mellitus with renal manifestations, not stated as uncontrolled(250.40) ([facility] [address] ICD-10-CM: E11.29  ICD-9-CM: 250.40  7/27/2015        HTN (hypertension) (Chronic) ICD-10-CM: I10  ICD-9-CM: 401.9  3/24/2015        Hypercholesteremia (Chronic) ICD-10-CM: E78.00  ICD-9-CM: 272.0  3/24/2015        Dysthymia ICD-10-CM: F34.1  ICD-9-CM: 300.4  3/24/2015        ADD (attention deficit disorder) (Chronic) ICD-10-CM: F98.8  ICD-9-CM: 314.00  1/24/2013        Insulin dependent diabetes mellitus (Chronic) ICD-10-CM: QPW9525  ICD-9-CM: 250.00, V58.67  1/24/2013 Review of Systems   All other systems reviewed and are negative. Past Medical History:   Diagnosis Date    Diabetes ([address]     Hypercholesterolemia     Hypertension       Past Surgical History:   Procedure Laterality Date    HX TONSILLECTOMY       Family History   Problem Relation Age of Onset    Cancer Mother         Breast    Cancer Father         Colon    Kidney Disease Brother     Diabetes Paternal Aunt     Sickle Cell Anemia Maternal Grandmother         Physical Exam  Constitutional:       General: He is not in acute distress. Appearance: Normal appearance. HENT:      Head: Normocephalic and atraumatic. Eyes:      Extraocular Movements: Extraocular movements intact. Pupils: Pupils are equal, round, and reactive to light. Cardiovascular:      Rate and Rhythm: Normal rate and regular rhythm. Pulmonary:      Effort: Pulmonary effort is normal. No respiratory distress. Breath sounds: Normal breath sounds. No wheezing or rhonchi. Genitourinary:     Penis: Normal. No phimosis, hypospadias or tenderness. Scrotum/Testes: Normal.         Right: Mass, tenderness or swelling not present. Left: Mass, tenderness or swelling not present. Epididymis:      Right: Normal. No mass or tenderness. Left: Normal. No mass or tenderness. Prostate: Enlarged (2+, lower half palpable). Not tender and no nodules present. Rectum: Normal.   Musculoskeletal: Normal range of motion. Lymphadenopathy:      Cervical: No cervical adenopathy. Upper Body:      Right upper body: No supraclavicular adenopathy. Left upper body: No supraclavicular adenopathy. Skin:     General: Skin is warm and dry. Neurological:      General: No focal deficit present. Mental Status: He is alert and oriented to person, place, and time.    Psychiatric:         Mood and Affect: Mood normal.         Behavior: Behavior normal.                     ASSESSMENT and PLAN  Diagnoses and all orders for this visit:    1. Pain in testicle, unspecified laterality  Assessment & Plan:  He likely has referred pains to the testicle. No primary inflammation or abnormality. He would like to see a chiropractor. Orders:  -     AMB POC URINALYSIS DIP STICK AUTO W/O MICRO  -     REFERRAL TO CHIROPRACTIC    2. Benign prostatic hyperplasia without lower urinary tract symptoms  -     PSA, DIAGNOSTIC (PROSTATE SPECIFIC AG)    3. Impotence  Assessment & Plan:  He is advised to try a Venoseal occlusive device. We discussed medications for ED and he had an unpleasant side effect to sildenafil. We will hold on further PDE5i.            Cullen Mejia MD

## 2020-10-15 LAB — PSA SERPL-MCNC: 0.7 NG/ML (ref 0–4)

## 2021-03-25 ENCOUNTER — IMMUNIZATION (OUTPATIENT)
Dept: INTERNAL MEDICINE CLINIC | Age: 66
End: 2021-03-25
Payer: COMMERCIAL

## 2021-03-25 DIAGNOSIS — Z23 ENCOUNTER FOR IMMUNIZATION: Primary | ICD-10-CM

## 2021-03-25 PROCEDURE — 0001A COVID-19, MRNA, LNP-S, PF, 30MCG/0.3ML DOSE(PFIZER): CPT | Performed by: FAMILY MEDICINE

## 2021-03-25 PROCEDURE — 91300 COVID-19, MRNA, LNP-S, PF, 30MCG/0.3ML DOSE(PFIZER): CPT | Performed by: FAMILY MEDICINE

## 2021-04-06 ENCOUNTER — HOSPITAL ENCOUNTER (EMERGENCY)
Age: 66
Discharge: HOME | End: 2021-04-06
Attending: EMERGENCY MEDICINE | Admitting: EMERGENCY MEDICINE
Payer: COMMERCIAL

## 2021-04-06 ENCOUNTER — APPOINTMENT (OUTPATIENT)
Dept: VASCULAR SURGERY | Age: 66
End: 2021-04-06
Attending: EMERGENCY MEDICINE
Payer: COMMERCIAL

## 2021-04-06 VITALS
DIASTOLIC BLOOD PRESSURE: 67 MMHG | HEART RATE: 64 BPM | SYSTOLIC BLOOD PRESSURE: 155 MMHG | RESPIRATION RATE: 16 BRPM | OXYGEN SATURATION: 99 % | TEMPERATURE: 97 F

## 2021-04-06 DIAGNOSIS — M79.89 SWELLING OF RIGHT LOWER EXTREMITY: Primary | ICD-10-CM

## 2021-04-06 LAB
GLUCOSE BLD STRIP.AUTO-MCNC: 83 MG/DL (ref 65–100)
SERVICE CMNT-IMP: NORMAL

## 2021-04-06 PROCEDURE — 93971 EXTREMITY STUDY: CPT

## 2021-04-06 PROCEDURE — 99282 EMERGENCY DEPT VISIT SF MDM: CPT

## 2021-04-06 PROCEDURE — 82962 GLUCOSE BLOOD TEST: CPT

## 2021-04-06 NOTE — ED TRIAGE NOTES
Pt c/o right leg swelling from knee down, pt with hx of the same, denies injury prior to swelling, did fall less than 2 weeks ago

## 2021-04-06 NOTE — DISCHARGE INSTRUCTIONS
Please try and elevate your leg to help decrease swelling. Schedule an appointment with your primary care doctor for re-evaluation.

## 2021-04-06 NOTE — ED PROVIDER NOTES
Patient is a 72-year-old male with a past medical history of diabetes, hyper lipidemia, hypertension who presents to ED due to right lower leg swelling which is intermittent over the past few weeks. Patient reports he had a fall going up stairs 2 weeks ago causing an abrasion below his right knee. Patient reports he has been caring for this with antibiotic ointment and that it appears to be healing well. Patient reports right lower leg swelling and he attributes this to his injury. Patient reports he has had ongoing right lower leg swelling but states he was concerned this time because it does not seem to be going away. Patient denies any calf pain, difficulty breathing, chest pain, palpitations, leg erythema or warmth, fever, chills, knee pain, ankle pain and gait abnormlaity.             Past Medical History:   Diagnosis Date    Diabetes ([address]     Hypercholesterolemia     Hypertension        Past Surgical History:   Procedure Laterality Date    HX TONSILLECTOMY           Family History:   Problem Relation Age of Onset    Cancer Mother         Breast    Cancer Father         Colon    Kidney Disease Brother     Diabetes Paternal Aunt     Sickle Cell Anemia Maternal Grandmother        Social History     Socioeconomic History    Marital status:      Spouse name: Not on file    Number of children: Not on file    Years of education: Not on file    Highest education level: Not on file   Occupational History    Not on file   Social Needs    Financial resource strain: Not on file    Food insecurity     Worry: Not on file     Inability: Not on file    Transportation needs     Medical: Not on file     Non-medical: Not on file   Tobacco Use    Smoking status: Never Smoker    Smokeless tobacco: Never Used   Substance and Sexual Activity    Alcohol use: No     Alcohol/week: 0.0 standard drinks    Drug use: Never    Sexual activity: Yes     Partners: Female   Lifestyle    Physical activity Days per week: Not on file     Minutes per session: Not on file    Stress: Not on file   Relationships    Social connections     Talks on phone: Not on file     Gets together: Not on file     Attends [employer] service: Not on file     Active member of club or organization: Not on file     Attends meetings of clubs or organizations: Not on file     Relationship status: Not on file    Intimate partner violence     Fear of current or ex partner: Not on file     Emotionally abused: Not on file     Physically abused: Not on file     Forced sexual activity: Not on file   Other Topics Concern    Not on file   Social History Narrative    Not on file         ALLERGIES: Patient has no known allergies. Review of Systems   Constitutional: Negative for chills and fever. Respiratory: Negative for cough and shortness of breath. Cardiovascular: Positive for leg swelling. Negative for chest pain and palpitations. Musculoskeletal: Negative for arthralgias, back pain, gait problem, joint swelling, myalgias, neck pain and neck stiffness. Skin: Negative for color change, rash and wound. Allergic/Immunologic: Negative for immunocompromised state. Neurological: Negative for weakness, light-headedness, numbness and headaches. Hematological: Does not bruise/bleed easily. All other systems reviewed and are negative. Vitals:    04/06/21 1155   BP: (!) 155/67   Pulse: 64   Resp: 16   Temp: 97 °F (36.1 °C)   SpO2: 99%            Physical Exam  Vitals signs and nursing note reviewed. Constitutional:       Appearance: Normal appearance. He is well-developed. HENT:      Head: Normocephalic and atraumatic. Nose: Nose normal.      Mouth/Throat:      Mouth: Mucous membranes are moist.   Eyes:      General: Lids are normal.      Extraocular Movements: Extraocular movements intact. Conjunctiva/sclera: Conjunctivae normal.   Neck:      Musculoskeletal: Normal range of motion and neck supple.    Cardiovascular: Rate and Rhythm: Normal rate. Pulses: Normal pulses. Heart sounds: S1 normal and S2 normal.   Pulmonary:      Effort: Pulmonary effort is normal. No accessory muscle usage. Abdominal:      General: Abdomen is flat. Palpations: Abdomen is soft. Musculoskeletal: Normal range of motion. Right lower le+ Edema present. Left lower leg: No edema. Comments: RLE edema. No calf tenderness noted. No tenderness to knee or ankle noted. Small abrasion well healing no signs of infection. Ambulates without difficulty. Neurovascularly intact distally. Skin:     General: Skin is warm and dry. Capillary Refill: Capillary refill takes less than 2 seconds. Comments: Small superficial abrasion to medial aspect below right knee that appears to be healing well with no surrounding erythema, warmth or drainage. Neurological:      General: No focal deficit present. Mental Status: He is alert and oriented to person, place, and time. Mental status is at baseline. Psychiatric:         Attention and Perception: Attention normal.         Mood and Affect: Mood and affect normal.         Speech: Speech normal.         Behavior: Behavior normal. Behavior is cooperative. Thought Content: Thought content normal.         Cognition and Memory: Cognition normal.         Judgment: Judgment normal.          MDM  Number of Diagnoses or Management Options  Swelling of right lower extremity  Diagnosis management comments: RLE edema x2 weeks. Will order duplex to r/o DVT. No signs of infection noted on exam. Patient reports increased salt intake over the past few days because his daughter was in town visiting. Will d/c with recommendation to elevate legs, watch salt intake and if symptoms persist to follow-up with PCP or [facility]. Patient understands and agrees with plan. Return to ER warnings discussed in detail.         Amount and/or Complexity of Data Reviewed  Tests in the radiology section of CPT®: reviewed  Discuss the patient with other providers: yes (Dr. Manan Coburn, ED Attending )           Procedures

## 2021-04-15 ENCOUNTER — IMMUNIZATION (OUTPATIENT)
Dept: INTERNAL MEDICINE CLINIC | Age: 66
End: 2021-04-15
Payer: COMMERCIAL

## 2021-04-15 DIAGNOSIS — Z23 ENCOUNTER FOR IMMUNIZATION: Primary | ICD-10-CM

## 2021-04-15 PROCEDURE — 0002A COVID-19, MRNA, LNP-S, PF, 30MCG/0.3ML DOSE(PFIZER): CPT | Performed by: FAMILY MEDICINE

## 2021-04-15 PROCEDURE — 91300 COVID-19, MRNA, LNP-S, PF, 30MCG/0.3ML DOSE(PFIZER): CPT | Performed by: FAMILY MEDICINE

## 2021-04-27 ENCOUNTER — TELEPHONE (OUTPATIENT)
Dept: CARDIOLOGY CLINIC | Age: 66
End: 2021-04-27

## 2021-04-27 NOTE — TELEPHONE ENCOUNTER
Medical records request has been fax to Corey Valera MD, at [phone]. Records requested:office notes, ekg, cardiac imaging and labs. Confirmation received.

## 2021-04-27 NOTE — PROGRESS NOTES
CARDIOLOGY OFFICE NOTE    Harsha Oliver MD, [address], [place], [address] [facility]  Phone [phone]; Fax [phone]  Mobile [phone]   Voice Mail [phone]    LAST OFFICE VISIT : Visit date not found  Riley Rios MD       ATTENTION:   This medical record was transcribed using an electronic medical records/speech recognition system. Although proofread, it may and can contain electronic, spelling and other errors. Corrections may be executed at a later time. Please feel free to contact us for any clarifications as needed. ICD-10-CM ICD-9-CM   1. Hypercholesteremia  E78.00 272.0   2. Essential hypertension  I10 401.9   3. Chest tightness  R07.89 786.59            Delilah Layton is a 60 y.o. male with  referred for      . Cardiac risk factors: family history, dyslipidemia, diabetes mellitus, male gender, hypertension  I have personally obtained the history from the patient. HISTORY OF PRESENTING ILLNESS     Patient was in the emergency room in early part of April. He is a 64-year-old gentleman with past medical history of diabetes, hypertension, and dyslipidemia. He noticed right lower leg swelling over the last several weeks. He denies ever seeing a cardiologist in the past. He has noticed fatigue over the last year. More difficult to rake leaves. He has no chest pain. He has no PND or orthopnea. walking today from parking noticed SOB. No PND or orthopnea.       ACTIVE PROBLEM LIST     Patient Active Problem List    Diagnosis Date Noted    Pain in testicle 10/14/2020    Impotence 10/14/2020    Type II or unspecified type diabetes mellitus with renal manifestations, not stated as uncontrolled(250.40) ([facility] [address] 07/27/2015    HTN (hypertension) 03/24/2015    Hypercholesteremia 03/24/2015    Dysthymia 03/24/2015    ADD (attention deficit disorder) 01/24/2013    Insulin dependent diabetes mellitus 01/24/2013           PAST MEDICAL HISTORY Past Medical History:   Diagnosis Date    Diabetes ([facility] [address]     Hypercholesterolemia     Hypertension            PAST SURGICAL HISTORY     Past Surgical History:   Procedure Laterality Date    HX TONSILLECTOMY            ALLERGIES     No Known Allergies       FAMILY HISTORY     Family History   Problem Relation Age of Onset    Cancer Mother         Breast    Cancer Father         Colon    Kidney Disease Brother     Diabetes Paternal Aunt     Sickle Cell Anemia Maternal Grandmother     negative for cardiac disease       SOCIAL HISTORY     Social History     Socioeconomic History    Marital status:      Spouse name: Not on file    Number of children: Not on file    Years of education: Not on file    Highest education level: Not on file   Tobacco Use    Smoking status: Never Smoker    Smokeless tobacco: Never Used   Substance and Sexual Activity    Alcohol use: No     Alcohol/week: 0.0 standard drinks    Drug use: Never    Sexual activity: Yes     Partners: Female         MEDICATIONS     Current Outpatient Medications   Medication Sig    aspirin delayed-release 81 mg tablet Take  by mouth daily.  ergocalciferol (ERGOCALCIFEROL) 1,250 mcg (50,000 unit) capsule TAKE 1 CAPSULE BY MOUTH 1 TIME A WEEK    hydroCHLOROthiazide (HYDRODIURIL) 12.5 mg tablet TAKE 1 TABLET BY MOUTH EVERY DAY    valsartan (DIOVAN) 80 mg tablet TAKE 2 TABLETS BY MOUTH DAILY FOR HIGH BLOOD PRESSURE    Jardiance 10 mg tablet TAKE 1 TABLET BY MOUTH EVERY DAY IN THE MORNING    b complex vitamins tablet Take 1 Tab by mouth daily.  vitamin e (E GEMS) 1,000 unit capsule Take 1,000 Units by mouth daily.  NOVOLOG FLEXPEN 100 unit/mL inpn 8-11 units three times a day    insulin glargine 300 unit/mL (1.5 mL) inpn 35 Units by SubCUTAneous route nightly.  atorvastatin (LIPITOR) 10 mg tablet     lisinopriL (PRINIVIL, ZESTRIL) 30 mg tablet lisinopril 30 mg tablet   Take 1 tablet every day by oral route.     atorvastatin (LIPITOR) 20 mg tablet TAKE 1 TABLET BY MOUTH EVERY DAY    metoprolol succinate (TOPROL-XL) 25 mg XL tablet Take 50 mg by mouth daily. No current facility-administered medications for this visit. I have reviewed the nurses notes, vitals, problem list, allergy list, medical history, family, social history and medications. REVIEW OF SYMPTOMS   Pertinent positive per HPI  General: Pt denies excessive weight gain or loss. Pt is able to conduct ADL's  HEENT: Denies blurred vision, headaches, hearing loss, epistaxis and difficulty swallowing. Respiratory: Denies cough, congestion, shortness of breath, ROSSI, wheezing or stridor. Cardiovascular: Denies precordial pain, palpitations, edema or PND  Gastrointestinal: Denies poor appetite, indigestion, abdominal pain or blood in stool  Genitourinary: Denies hematuria, dysuria, increased urinary frequency  Musculoskeletal: Denies joint pain or swelling from muscles or joints  Neurologic: Denies tremor, paresthesias, headache, or sensory motor disturbance  Psychiatric: Denies confusion, insomnia, depression  Integumentray: Denies rash, itching or ulcers. Hematologic: Denies easy bruising, bleeding     PHYSICAL EXAMINATION      Vitals:    04/28/21 1346   BP: 110/60   Pulse: 64   SpO2: 96%   Weight: 232 lb (105.2 kg)   Height: 6' 2\" (1.88 m)     General: Well developed, in no acute distress. HEENT: No jaundice, oral mucosa moist, no oral ulcers  Neck: Supple, no stiffness, no lymphadenopathy, supple  Heart:  Normal S1/S2 negative S3 or S4. Regular, no murmur, gallop or rub, no jugular venous distention  Respiratory: Clear bilaterally x 4, no wheezing or rales  Abdomen:   Soft, non-tender, bowel sounds are active. Extremities:  No edema, normal cap refill, no cyanosis. Musculoskeletal: No clubbing, no deformities  Neuro: A&Ox3, speech clear, gait stable, cooperative, no focal neurologic deficits  Skin: Skin color is normal. No rashes or lesions.  Non diaphoretic, moist.  Vascular: 2+ pulses symmetric in all extremities        EKG: Normal sinus rhythm     DIAGNOSTIC DATA     1. LE Venous Duplex  4/6/21-No evidence of thrombosis in the right lower extremity         LABORATORY DATA            Lab Results   Component Value Date/Time    WBC 14.1 (H) 10/30/2017 05:15 AM    HGB 17.0 10/30/2017 05:15 AM    HCT 50.7 (H) 10/30/2017 05:15 AM    PLATELET 249 50/17/5042 05:15 AM    MCV 86.8 10/30/2017 05:15 AM      Lab Results   Component Value Date/Time    Sodium 138 10/30/2017 05:15 AM    Potassium 3.5 10/30/2017 05:15 AM    Chloride 102 10/30/2017 05:15 AM    CO2 31 10/30/2017 05:15 AM    Anion gap 5 10/30/2017 05:15 AM    Glucose 52 (L) 10/30/2017 05:15 AM    BUN 15 10/30/2017 05:15 AM    Creatinine 1.73 (H) 10/30/2017 05:15 AM    BUN/Creatinine ratio 9 (L) 10/30/2017 05:15 AM    GFR est AA 49 (L) 10/30/2017 05:15 AM    GFR est non-AA 40 (L) 10/30/2017 05:15 AM    Calcium 8.8 10/30/2017 05:15 AM    Bilirubin, total 0.5 10/30/2017 05:15 AM    Alk. phosphatase 65 10/30/2017 05:15 AM    Protein, total 7.9 10/30/2017 05:15 AM    Albumin 4.0 10/30/2017 05:15 AM    Globulin 3.9 10/30/2017 05:15 AM    A-G Ratio 1.0 (L) 10/30/2017 05:15 AM    ALT (SGPT) 47 10/30/2017 05:15 AM           ASSESSMENT/RECOMMENDATIONS:.      1.  Lower extremity edema/shortness of breath   He does have underlying diabetes hypertension and dyslipidemia so I think it would be important that-We will go forward with cardiac testing. I favor going forward with a exercise Cardiolite as well as an echocardiogram   -Counseled on importance of eating healthy including exercise and reducing red meat intake  2. Hypertension  -Blood pressure is excellent on current medical regimen  -Continue diet low in sodium  3. Dyslipidemia  -No recent cholesterol but will obtain from his primary care  4. Diabetes on insulin  -last Hgb A1c was 9  5.  ZEYAD  -Compliant with CPAP  Orders Placed This Encounter    AMB POC EKG ROUTINE W/ 12 LEADS, INTER & REP     Order Specific Question:   Reason for Exam:     Answer:   Chest tightness    aspirin delayed-release 81 mg tablet     Sig: Take  by mouth daily.  ergocalciferol (ERGOCALCIFEROL) 1,250 mcg (50,000 unit) capsule     Sig: TAKE 1 CAPSULE BY MOUTH 1 TIME A WEEK    hydroCHLOROthiazide (HYDRODIURIL) 12.5 mg tablet     Sig: TAKE 1 TABLET BY MOUTH EVERY DAY    lisinopriL (PRINIVIL, ZESTRIL) 30 mg tablet     Sig: lisinopril 30 mg tablet   Take 1 tablet every day by oral route.  valsartan (DIOVAN) 80 mg tablet     Sig: TAKE 2 TABLETS BY MOUTH DAILY FOR HIGH BLOOD PRESSURE    atorvastatin (LIPITOR) 20 mg tablet     Sig: TAKE 1 TABLET BY MOUTH EVERY DAY    Jardiance 10 mg tablet     Sig: TAKE 1 TABLET BY MOUTH EVERY DAY IN THE MORNING    b complex vitamins tablet     Sig: Take 1 Tab by mouth daily.  vitamin e (E GEMS) 1,000 unit capsule     Sig: Take 1,000 Units by mouth daily. We discussed the expected course, resolution and complications of the diagnosis(es) in detail. Medication risks, benefits, costs, interactions, and alternatives were discussed as indicated. I advised him to contact the office if his condition worsens, changes or fails to improve as anticipated. He expressed understanding with the diagnosis(es) and plan          Follow-up and Dispositions  ·   Return in about 5 weeks (around 6/2/2021). I have discussed the diagnosis with  Sowmya Owens and the intended plan as seen in the above orders. Questions were answered concerning future plans. I have discussed medication side effects and warnings with the patient as well. Thank you,  Linda Dang MD for involving me in the care of  Sowmya Owens. Please do not hesitate to contact me for further questions/concerns. Harsha Lawson MD, [address] [place] [address] Rd[address]      [address] [facility], [address]     [place], [place] [address]      [phone] / [phone] Fax

## 2021-04-28 ENCOUNTER — OFFICE VISIT (OUTPATIENT)
Dept: CARDIOLOGY CLINIC | Age: 66
End: 2021-04-28
Payer: COMMERCIAL

## 2021-04-28 VITALS
BODY MASS INDEX: 29.77 KG/M2 | SYSTOLIC BLOOD PRESSURE: 110 MMHG | OXYGEN SATURATION: 96 % | DIASTOLIC BLOOD PRESSURE: 60 MMHG | HEART RATE: 64 BPM | WEIGHT: 232 LBS | HEIGHT: 74 IN

## 2021-04-28 DIAGNOSIS — E78.00 HYPERCHOLESTEREMIA: Primary | ICD-10-CM

## 2021-04-28 DIAGNOSIS — R07.89 CHEST TIGHTNESS: ICD-10-CM

## 2021-04-28 DIAGNOSIS — I10 ESSENTIAL HYPERTENSION: ICD-10-CM

## 2021-04-28 PROCEDURE — 99204 OFFICE O/P NEW MOD 45 MIN: CPT | Performed by: SPECIALIST

## 2021-04-28 PROCEDURE — 93000 ELECTROCARDIOGRAM COMPLETE: CPT | Performed by: SPECIALIST

## 2021-04-28 RX ORDER — ASPIRIN 81 MG/1
TABLET ORAL DAILY
COMMUNITY

## 2021-04-28 RX ORDER — MULTIVIT WITH MINERALS/HERBS
1 TABLET ORAL DAILY
COMMUNITY
End: 2022-03-10

## 2021-04-28 RX ORDER — VALSARTAN 80 MG/1
TABLET ORAL
COMMUNITY
Start: 2021-04-21

## 2021-04-28 RX ORDER — ATORVASTATIN CALCIUM 20 MG/1
TABLET, FILM COATED ORAL
COMMUNITY
Start: 2021-02-12 | End: 2021-04-28 | Stop reason: ALTCHOICE

## 2021-04-28 RX ORDER — ERGOCALCIFEROL 1.25 MG/1
CAPSULE ORAL
COMMUNITY
Start: 2021-03-20

## 2021-04-28 RX ORDER — HYDROCHLOROTHIAZIDE 12.5 MG/1
TABLET ORAL
COMMUNITY
Start: 2021-04-09 | End: 2021-07-21

## 2021-04-28 RX ORDER — MENTHOL
1000 GEL (GRAM) TOPICAL DAILY
COMMUNITY
End: 2021-06-10

## 2021-04-28 RX ORDER — EMPAGLIFLOZIN 10 MG/1
TABLET, FILM COATED ORAL
COMMUNITY
Start: 2021-02-12 | End: 2022-03-10 | Stop reason: DRUGHIGH

## 2021-04-28 RX ORDER — LISINOPRIL 30 MG/1
TABLET ORAL
COMMUNITY
End: 2021-04-28 | Stop reason: ALTCHOICE

## 2021-04-28 NOTE — PROGRESS NOTES
Room 0    Visit Vitals  /60 (BP 1 Location: Left upper arm, BP Patient Position: Sitting, BP Cuff Size: Large adult)   Pulse 64   Ht 6' 2\" (1.88 m)   Wt 232 lb (105.2 kg)   SpO2 96%   BMI 29.79 kg/m²       PCP recommended experiencing chronic fatigue due to aging and diabetes   Shortness of breath  On asa 80    Seen cardiology before: First timer     Chest pain: no  Shortness of breath: no  Edema: no  Palpitations: no  Dizziness: no    ER/Urgent care/Hospitalizations for your symptoms: 4-6-21 [facility] RAHEEL kuo

## 2021-04-28 NOTE — PATIENT INSTRUCTIONS
1) we will order exercise Cardiolite to look at myocardial perfusion    2) we will do an echocardiogram to look at LV function    3) consider calcium scoring    4) will obtain cholesterol profile from     5) return to see me in 4 to 6 weeks

## 2021-05-18 ENCOUNTER — ANCILLARY PROCEDURE (OUTPATIENT)
Dept: CARDIOLOGY CLINIC | Age: 66
End: 2021-05-18

## 2021-05-18 ENCOUNTER — TELEPHONE (OUTPATIENT)
Dept: CARDIOLOGY CLINIC | Age: 66
End: 2021-05-18

## 2021-05-18 ENCOUNTER — ANCILLARY PROCEDURE (OUTPATIENT)
Dept: CARDIOLOGY CLINIC | Age: 66
End: 2021-05-18
Payer: COMMERCIAL

## 2021-05-18 VITALS
WEIGHT: 232 LBS | HEIGHT: 74 IN | SYSTOLIC BLOOD PRESSURE: 128 MMHG | BODY MASS INDEX: 29.77 KG/M2 | DIASTOLIC BLOOD PRESSURE: 68 MMHG

## 2021-05-18 VITALS — WEIGHT: 232 LBS | HEIGHT: 74 IN | BODY MASS INDEX: 29.77 KG/M2

## 2021-05-18 DIAGNOSIS — I10 ESSENTIAL HYPERTENSION: ICD-10-CM

## 2021-05-18 DIAGNOSIS — R07.89 CHEST TIGHTNESS: ICD-10-CM

## 2021-05-18 DIAGNOSIS — R07.89 CHEST DISCOMFORT: ICD-10-CM

## 2021-05-18 PROCEDURE — A9500 TC99M SESTAMIBI: HCPCS | Performed by: STUDENT IN AN ORGANIZED HEALTH CARE EDUCATION/TRAINING PROGRAM

## 2021-05-18 PROCEDURE — 93306 TTE W/DOPPLER COMPLETE: CPT | Performed by: SPECIALIST

## 2021-05-18 PROCEDURE — 78451 HT MUSCLE IMAGE SPECT SING: CPT | Performed by: STUDENT IN AN ORGANIZED HEALTH CARE EDUCATION/TRAINING PROGRAM

## 2021-05-18 RX ORDER — TETRAKIS(2-METHOXYISOBUTYLISOCYANIDE)COPPER(I) TETRAFLUOROBORATE 1 MG/ML
24.5 INJECTION, POWDER, LYOPHILIZED, FOR SOLUTION INTRAVENOUS ONCE
Status: COMPLETED | OUTPATIENT
Start: 2021-05-18 | End: 2021-05-18

## 2021-05-18 RX ADMIN — TETRAKIS(2-METHOXYISOBUTYLISOCYANIDE)COPPER(I) TETRAFLUOROBORATE 24.5 MILLICURIE: 1 INJECTION, POWDER, LYOPHILIZED, FOR SOLUTION INTRAVENOUS at 14:30

## 2021-05-18 NOTE — TELEPHONE ENCOUNTER
Patient calling to clarify instructions for his nuclear on 5/18/21, he is to hold metoprolol but he would like to know if he should also hold aspirin 81 mgand valsartan 80 mg, patient is also concerned with not having a snack as he has diabetes, please advise        [phone]

## 2021-05-19 LAB
STRESS ANGINA INDEX: 0
STRESS BASELINE DIAS BP: 68 MMHG
STRESS BASELINE HR: 76 BPM
STRESS BASELINE SYS BP: 128 MMHG
STRESS ESTIMATED WORKLOAD: 10.4 METS
STRESS EXERCISE DUR MIN: NORMAL MIN:SEC
STRESS O2 SAT PEAK: 99 %
STRESS O2 SAT REST: 99 %
STRESS PEAK DIAS BP: 60 MMHG
STRESS PEAK SYS BP: 188 MMHG
STRESS PERCENT HR ACHIEVED: 91 %
STRESS POST PEAK HR: 142 BPM
STRESS RATE PRESSURE PRODUCT: NORMAL BPM*MMHG
STRESS SR DUKE TREADMILL SCORE: 4
STRESS ST DEPRESSION: 1 MM
STRESS TARGET HR: 156 BPM

## 2021-05-20 LAB
ECHO AO ASC DIAM: 2.62 CM
ECHO AO ROOT DIAM: 3.33 CM
ECHO AV AREA PEAK VELOCITY: 3.63 CM2
ECHO AV AREA VTI: 3.5 CM2
ECHO AV AREA/BSA PEAK VELOCITY: 1.6 CM2/M2
ECHO AV AREA/BSA VTI: 1.5 CM2/M2
ECHO AV MEAN GRADIENT: 2.8 MMHG
ECHO AV PEAK GRADIENT: 5.46 MMHG
ECHO AV PEAK VELOCITY: 116.85 CM/S
ECHO AV VTI: 25.09 CM
ECHO EST RA PRESSURE: 3 MMHG
ECHO LA AREA 4C: 17.55 CM2
ECHO LA MAJOR AXIS: 3.98 CM
ECHO LA MINOR AXIS: 1.72 CM
ECHO LA VOL 2C: 37.68 ML (ref 18–58)
ECHO LA VOL 4C: 47.46 ML (ref 18–58)
ECHO LA VOL BP: 49.82 ML (ref 18–58)
ECHO LA VOL/BSA BIPLANE: 21.57 ML/M2 (ref 16–28)
ECHO LA VOLUME INDEX A2C: 16.31 ML/M2 (ref 16–28)
ECHO LA VOLUME INDEX A4C: 20.55 ML/M2 (ref 16–28)
ECHO LV E' LATERAL VELOCITY: 8.99 CM/S
ECHO LV E' SEPTAL VELOCITY: 7.74 CM/S
ECHO LV INTERNAL DIMENSION DIASTOLIC: 4.75 CM (ref 4.2–5.9)
ECHO LV INTERNAL DIMENSION SYSTOLIC: 3.08 CM
ECHO LV IVSD: 0.89 CM (ref 0.6–1)
ECHO LV MASS 2D: 153.9 G (ref 88–224)
ECHO LV MASS INDEX 2D: 66.6 G/M2 (ref 49–115)
ECHO LV POSTERIOR WALL DIASTOLIC: 0.99 CM (ref 0.6–1)
ECHO LVOT DIAM: 2.29 CM
ECHO LVOT PEAK GRADIENT: 4.27 MMHG
ECHO LVOT PEAK VELOCITY: 103.32 CM/S
ECHO LVOT SV: 87.8 ML
ECHO LVOT VTI: 21.38 CM
ECHO MV A VELOCITY: 59.93 CM/S
ECHO MV E DECELERATION TIME (DT): 257.83 MS
ECHO MV E VELOCITY: 54.92 CM/S
ECHO MV E/A RATIO: 0.92
ECHO MV E/E' LATERAL: 6.11
ECHO MV E/E' RATIO (AVERAGED): 6.6
ECHO MV E/E' SEPTAL: 7.1
ECHO MV MAX VELOCITY: 63.48 CM/S
ECHO MV MEAN GRADIENT: 0.57 MMHG
ECHO MV PEAK GRADIENT: 1.61 MMHG
ECHO MV PRESSURE HALF TIME (PHT): 74.77 MS
ECHO MV VTI: 20.05 CM
ECHO RA AREA 4C: 14.11 CM2
ECHO RV INTERNAL DIMENSION: 3.86 CM
ECHO RV TAPSE: 2.4 CM (ref 1.5–2)

## 2021-06-10 ENCOUNTER — OFFICE VISIT (OUTPATIENT)
Dept: CARDIOLOGY CLINIC | Age: 66
End: 2021-06-10
Payer: COMMERCIAL

## 2021-06-10 VITALS
OXYGEN SATURATION: 98 % | BODY MASS INDEX: 29.39 KG/M2 | SYSTOLIC BLOOD PRESSURE: 164 MMHG | WEIGHT: 229 LBS | DIASTOLIC BLOOD PRESSURE: 84 MMHG | HEART RATE: 63 BPM | HEIGHT: 74 IN

## 2021-06-10 DIAGNOSIS — R07.89 CHEST DISCOMFORT: Primary | ICD-10-CM

## 2021-06-10 DIAGNOSIS — E78.00 HYPERCHOLESTEREMIA: ICD-10-CM

## 2021-06-10 DIAGNOSIS — I10 ESSENTIAL HYPERTENSION: ICD-10-CM

## 2021-06-10 PROCEDURE — 99214 OFFICE O/P EST MOD 30 MIN: CPT | Performed by: SPECIALIST

## 2021-06-10 RX ORDER — VITAMIN E 1000 UNIT
500 CAPSULE ORAL DAILY
COMMUNITY

## 2021-06-10 NOTE — PROGRESS NOTES
Doreen Zaragoza is a 60 y.o. male    Visit Vitals  BP (!) 164/84 (BP 1 Location: Left upper arm, BP Patient Position: Sitting, BP Cuff Size: Adult)   Pulse 63   Ht 6' 2\" (1.88 m)   Wt 229 lb (103.9 kg)   SpO2 98%   BMI 29.40 kg/m²       Chief Complaint   Patient presents with    Follow-up    Shortness of Breath    Other     EDEMA       Chest pain NO  SOB NO  Dizziness NO  Swelling NO  Recent hospital visit NO  Refills NO

## 2021-06-10 NOTE — PROGRESS NOTES
CARDIOLOGY OFFICE NOTE    Harsha Hardy MD, [address], [place], [address] [facility]  Phone [phone]; Fax [phone]  Mobile [phone]   Voice Mail [phone]    LAST OFFICE VISIT : Visit date not found  Zo Lyles MD       ATTENTION:   This medical record was transcribed using an electronic medical records/speech recognition system. Although proofread, it may and can contain electronic, spelling and other errors. Corrections may be executed at a later time. Please feel free to contact us for any clarifications as needed. ICD-10-CM ICD-9-CM   1. Chest discomfort  R07.89 786.59   2. Essential hypertension  I10 401.9   3. Hypercholesteremia  E78.00 272.0            Miles Jose Alfredo is a 60 y.o. male with  referred for      . Cardiac risk factors: family history, dyslipidemia, diabetes mellitus, male gender, hypertension  I have personally obtained the history from the patient. HISTORY OF PRESENTING ILLNESS       He is a 29-year-old gentleman with past medical history of diabetes, hypertension, and dyslipidemia. He returns today to follow-up on all his cardiac testing. He had echo that demonstrated EF of 60% he had a venous duplex studies of his lower extremities with no evidence of DVT and he also had a stress test.  It was a exercise Cardiolite and he walked on the treadmill for 9 minutes he had ST depression in inferior leads but there was normal myocardial perfusion. His hemoglobin A1c is elevated and his LDL is not available to me at this time.      ACTIVE PROBLEM LIST     Patient Active Problem List    Diagnosis Date Noted    Pain in testicle 10/14/2020    Impotence 10/14/2020    Type II or unspecified type diabetes mellitus with renal manifestations, not stated as uncontrolled(250.40) ([facility] [address] 07/27/2015    HTN (hypertension) 03/24/2015    Hypercholesteremia 03/24/2015    Dysthymia 03/24/2015    ADD (attention deficit disorder) 01/24/2013    Insulin dependent diabetes mellitus 01/24/2013           PAST MEDICAL HISTORY     Past Medical History:   Diagnosis Date    Diabetes ([facility] [address]     Hypercholesterolemia     Hypertension            PAST SURGICAL HISTORY     Past Surgical History:   Procedure Laterality Date    HX TONSILLECTOMY            ALLERGIES     No Known Allergies       FAMILY HISTORY     Family History   Problem Relation Age of Onset    Cancer Mother         Breast    Cancer Father         Colon    Kidney Disease Brother     Diabetes Paternal Aunt     Sickle Cell Anemia Maternal Grandmother     negative for cardiac disease       SOCIAL HISTORY     Social History     Socioeconomic History    Marital status:      Spouse name: Not on file    Number of children: Not on file    Years of education: Not on file    Highest education level: Not on file   Tobacco Use    Smoking status: Never Smoker    Smokeless tobacco: Never Used   Substance and Sexual Activity    Alcohol use: No     Alcohol/week: 0.0 standard drinks    Drug use: Never    Sexual activity: Yes     Partners: Female     Social Determinants of Health     Financial Resource Strain:     Difficulty of Paying Living Expenses:    Food Insecurity:     Worried About Running Out of Food in the Last Year:     Ran Out of Food in the Last Year:    Transportation Needs:     Lack of Transportation (Medical):      Lack of Transportation (Non-Medical):    Physical Activity:     Days of Exercise per Week:     Minutes of Exercise per Session:    Stress:     Feeling of Stress :    Social Connections:     Frequency of Communication with Friends and Family:     Frequency of Social Gatherings with Friends and Family:     Attends Jew Services:     Active Member of Clubs or Organizations:     Attends Club or Organization Meetings:     Marital Status:          MEDICATIONS     Current Outpatient Medications   Medication Sig    ascorbic acid, vitamin C, (VITAMIN C) 1,000 mg tablet Take  by mouth.  aspirin delayed-release 81 mg tablet Take  by mouth daily.  ergocalciferol (ERGOCALCIFEROL) 1,250 mcg (50,000 unit) capsule TAKE 1 CAPSULE BY MOUTH 1 TIME A WEEK    hydroCHLOROthiazide (HYDRODIURIL) 12.5 mg tablet TAKE 1 TABLET BY MOUTH EVERY DAY    valsartan (DIOVAN) 80 mg tablet TAKE 2 TABLETS BY MOUTH DAILY FOR HIGH BLOOD PRESSURE    Jardiance 10 mg tablet TAKE 1 TABLET BY MOUTH EVERY DAY IN THE MORNING    b complex vitamins tablet Take 1 Tab by mouth daily.  metoprolol succinate (TOPROL-XL) 25 mg XL tablet Take 50 mg by mouth daily.  NOVOLOG FLEXPEN 100 unit/mL inpn 8-11 units three times a day    insulin glargine 300 unit/mL (1.5 mL) inpn 35 Units by SubCUTAneous route nightly.  atorvastatin (LIPITOR) 10 mg tablet     vitamin e (E GEMS) 1,000 unit capsule Take 1,000 Units by mouth daily. (Patient not taking: Reported on 6/10/2021)     No current facility-administered medications for this visit. I have reviewed the nurses notes, vitals, problem list, allergy list, medical history, family, social history and medications. REVIEW OF SYMPTOMS   Pertinent positive per HPI  General: Pt denies excessive weight gain or loss. Pt is able to conduct ADL's  HEENT: Denies blurred vision, headaches, hearing loss, epistaxis and difficulty swallowing. Respiratory: Denies cough, congestion, shortness of breath, ROSSI, wheezing or stridor. Cardiovascular: Denies precordial pain, palpitations, edema or PND  Gastrointestinal: Denies poor appetite, indigestion, abdominal pain or blood in stool  Genitourinary: Denies hematuria, dysuria, increased urinary frequency  Musculoskeletal: Denies joint pain or swelling from muscles or joints  Neurologic: Denies tremor, paresthesias, headache, or sensory motor disturbance  Psychiatric: Denies confusion, insomnia, depression  Integumentray: Denies rash, itching or ulcers.   Hematologic: Denies easy bruising, bleeding     PHYSICAL EXAMINATION      Vitals:    06/10/21 1506   BP: (!) 164/84   Pulse: 63   SpO2: 98%   Weight: 229 lb (103.9 kg)   Height: 6' 2\" (1.88 m)     General: Well developed, in no acute distress. HEENT: No jaundice, oral mucosa moist, no oral ulcers  Neck: Supple, no stiffness, no lymphadenopathy, supple  Heart:  Normal S1/S2 negative S3 or S4. Regular, no murmur, gallop or rub, no jugular venous distention  Respiratory: Clear bilaterally x 4, no wheezing or rales  Extremities:  No edema, normal cap refill, no cyanosis. Musculoskeletal: No clubbing, no deformities  Neuro: A&Ox3, speech clear, gait stable, cooperative, no focal neurologic deficits  Skin: Skin color is normal. No rashes or lesions. Non diaphoretic, moist.          EKG: Normal sinus rhythm     DIAGNOSTIC DATA     1. LE Venous Duplex   4/6/21-No evidence of thrombosis in the right lower extremity     2. Echo   5/18/21- EF 60%    3. Stress Test  5/18/21-Cardiolite-Good functional capacity completing 9 mins of Chava Protocol. ECG during stress with frequent PVCs along with 1mm upsloping depression within inferior leads, resulting in Sweeney Treadmill score of 4. Normal myocardial perfusion imaging.          LABORATORY DATA            Lab Results   Component Value Date/Time    WBC 14.1 (H) 10/30/2017 05:15 AM    HGB 17.0 10/30/2017 05:15 AM    HCT 50.7 (H) 10/30/2017 05:15 AM    PLATELET 774 60/47/2938 05:15 AM    MCV 86.8 10/30/2017 05:15 AM      Lab Results   Component Value Date/Time    Sodium 138 10/30/2017 05:15 AM    Potassium 3.5 10/30/2017 05:15 AM    Chloride 102 10/30/2017 05:15 AM    CO2 31 10/30/2017 05:15 AM    Anion gap 5 10/30/2017 05:15 AM    Glucose 52 (L) 10/30/2017 05:15 AM    BUN 15 10/30/2017 05:15 AM    Creatinine 1.73 (H) 10/30/2017 05:15 AM    BUN/Creatinine ratio 9 (L) 10/30/2017 05:15 AM    GFR est AA 49 (L) 10/30/2017 05:15 AM    GFR est non-AA 40 (L) 10/30/2017 05:15 AM    Calcium 8.8 10/30/2017 05:15 AM Bilirubin, total 0.5 10/30/2017 05:15 AM    Alk. phosphatase 65 10/30/2017 05:15 AM    Protein, total 7.9 10/30/2017 05:15 AM    Albumin 4.0 10/30/2017 05:15 AM    Globulin 3.9 10/30/2017 05:15 AM    A-G Ratio 1.0 (L) 10/30/2017 05:15 AM    ALT (SGPT) 47 10/30/2017 05:15 AM           ASSESSMENT/RECOMMENDATIONS:.      1.  shortness of breath   -His shortness of breath is intermittent at this time but is very concerning with his diabetes is poorly controlled hypertension is poorly controlled and unsure what his LDL is think is at risk for heart disease.  -The EKG changes are concerning because he may have a equal distribution of contrast during the study and may have significant disease so I am asking that he have a coronary CT angiogram.    2.  Hypertension  -Blood pressure is elevated but he states at home its normal so he will follow his blood pressures for the next 4 weeks and write them down and bring them in in 4 weeks and will repeat a blood pressure     3. Dyslipidemia  -Given a lab requisition have his lipids checked    4. Diabetes on insulin  -last Hgb A1c was 9    5. ZEYAD  -Compliant with CPAP  Orders Placed This Encounter    ascorbic acid, vitamin C, (VITAMIN C) 1,000 mg tablet     Sig: Take  by mouth. We discussed the expected course, resolution and complications of the diagnosis(es) in detail. Medication risks, benefits, costs, interactions, and alternatives were discussed as indicated. I advised him to contact the office if his condition worsens, changes or fails to improve as anticipated. He expressed understanding with the diagnosis(es) and plan          Follow-up and Dispositions  ·   Return in about 6 months (around 12/10/2021). I have discussed the diagnosis with  Winter Phillip and the intended plan as seen in the above orders. Questions were answered concerning future plans. I have discussed medication side effects and warnings with the patient as well.     Thank you,  Olu Fermín Miles MD for involving me in the care of  Hellen Messer. Please do not hesitate to contact me for further questions/concerns. Harsha Lawson MD, [facility] [address]      [facility], [address] [place] [address]      [phone] / [phone] Fax

## 2021-06-10 NOTE — PATIENT INSTRUCTIONS
1) we will attempt to get your coronary CT angiogram.  I am concerned that you had significant EKG changes but normal perfusion. 2) will obtain a fasting lipid profile at [facility] within the next several weeks 3) blood pressure was elevated today when I took it was 160/80 and will repeat your blood pressure in 4 weeks. Follow your blood pressures at home and record them.

## 2021-06-16 ENCOUNTER — TRANSCRIBE ORDER (OUTPATIENT)
Dept: SCHEDULING | Age: 66
End: 2021-06-16

## 2021-06-16 DIAGNOSIS — R10.31 ABDOMINAL PAIN, RIGHT LOWER QUADRANT: Primary | ICD-10-CM

## 2021-06-18 ENCOUNTER — HOSPITAL ENCOUNTER (OUTPATIENT)
Dept: ULTRASOUND IMAGING | Age: 66
Discharge: HOME | End: 2021-06-18
Attending: FAMILY MEDICINE
Payer: COMMERCIAL

## 2021-06-18 DIAGNOSIS — R10.31 ABDOMINAL PAIN, RIGHT LOWER QUADRANT: ICD-10-CM

## 2021-06-18 PROCEDURE — 76700 US EXAM ABDOM COMPLETE: CPT

## 2021-06-23 ENCOUNTER — TELEPHONE (OUTPATIENT)
Dept: CARDIOLOGY CLINIC | Age: 66
End: 2021-06-23

## 2021-06-23 NOTE — TELEPHONE ENCOUNTER
Patient states he has not heard from the hospital to schedule a CT Scan within a week. Patient wanted to keep Dr. Natalia Glover updated with the situation. The hospital scheduling number was given to the patient.      PHONE: [phone]

## 2021-06-24 ENCOUNTER — TELEPHONE (OUTPATIENT)
Dept: CARDIOLOGY CLINIC | Age: 66
End: 2021-06-24

## 2021-06-24 DIAGNOSIS — I10 ESSENTIAL HYPERTENSION: ICD-10-CM

## 2021-06-24 DIAGNOSIS — R06.02 SHORTNESS OF BREATH: Primary | ICD-10-CM

## 2021-06-24 RX ORDER — ATENOLOL 25 MG/1
TABLET ORAL
COMMUNITY
End: 2022-03-10

## 2021-06-24 NOTE — TELEPHONE ENCOUNTER
Patient requesting to speak to [facility] about CT Scan, and has confusion on the steps.      PHONE: [phone]

## 2021-06-28 NOTE — TELEPHONE ENCOUNTER
Patient is requesting information on when he will be scheduled for the CTA test and where he should have it done.       [facility][phone]

## 2021-06-28 NOTE — TELEPHONE ENCOUNTER
Gave pt conceirge # to schedule CT and asked him to take 25 mg of Metoprolol night prior to CT and 75 mg 1 hour before CT.

## 2021-07-01 DIAGNOSIS — E78.00 HYPERCHOLESTEREMIA: Primary | ICD-10-CM

## 2021-07-01 LAB
ALBUMIN SERPL-MCNC: 4.4 G/DL (ref 3.8–4.8)
ALP SERPL-CCNC: 72 IU/L (ref 48–121)
ALT SERPL-CCNC: 40 IU/L (ref 0–44)
AST SERPL-CCNC: 34 IU/L (ref 0–40)
BILIRUB DIRECT SERPL-MCNC: 0.23 MG/DL (ref 0–0.4)
BILIRUB SERPL-MCNC: 0.7 MG/DL (ref 0–1.2)
CHOLEST SERPL-MCNC: 114 MG/DL (ref 100–199)
HDLC SERPL-MCNC: 43 MG/DL
IMP & REVIEW OF LAB RESULTS: NORMAL
LDLC SERPL CALC-MCNC: 57 MG/DL (ref 0–99)
PROT SERPL-MCNC: 7.4 G/DL (ref 6–8.5)
TRIGL SERPL-MCNC: 62 MG/DL (ref 0–149)
VLDLC SERPL CALC-MCNC: 14 MG/DL (ref 5–40)

## 2021-07-02 DIAGNOSIS — E78.00 HYPERCHOLESTEREMIA: Primary | ICD-10-CM

## 2021-07-12 ENCOUNTER — TELEPHONE (OUTPATIENT)
Dept: CARDIOLOGY CLINIC | Age: 66
End: 2021-07-12

## 2021-07-12 NOTE — TELEPHONE ENCOUNTER
Last office AVS instructions from 6/10/21: \"Return in about 4 weeks (around 7/8/2021). 1) we will attempt to get your coronary CT angiogram.  I am concerned that you had significant EKG changes but normal perfusion.    2) will obtain a fasting lipid profile at [facility] within the next several weeks     3) blood pressure was elevated today when I took it was 160/80 and will repeat your blood pressure in 4 weeks. Follow your blood pressures at home and record them. \"  cCTA scheduled for 8/2. Called pt,  He stated LOV wanted to schedule cCTA, insurance pushed back and scheduling pushed back. Discussed keeping OV of 7/21 for BP check and then having either a VV or 2nd OV after cCTA results come back if more serious findings. Pt expressed understanding of plan.
Patient calling to speak to the nurse as he has a office visit scheduled for 7/21/21 and would like to know would it be necessary to come on this day or is he able to wait till after his procedure scheduled on 8/02/21 to see the doctor, please advise        [phone]
[de-identified] : Hannah is a 26 y/o females here for a consultation for weight loss surgery.27-year-old female 5 foot 4 272 pounds with a BMI of 46.7 she has been heavy most of her adult life she has been on multiple diet programs in the past losing up to 22 pounds always gaining the weight back she is looking for more permanent solution for her weight loss problem she is interested in a sleeve gastrectomy

## 2021-07-21 ENCOUNTER — OFFICE VISIT (OUTPATIENT)
Dept: CARDIOLOGY CLINIC | Age: 66
End: 2021-07-21
Payer: COMMERCIAL

## 2021-07-21 VITALS
BODY MASS INDEX: 29.13 KG/M2 | HEIGHT: 74 IN | WEIGHT: 227 LBS | SYSTOLIC BLOOD PRESSURE: 126 MMHG | DIASTOLIC BLOOD PRESSURE: 68 MMHG | HEART RATE: 66 BPM | OXYGEN SATURATION: 98 %

## 2021-07-21 DIAGNOSIS — E78.00 HYPERCHOLESTEREMIA: ICD-10-CM

## 2021-07-21 DIAGNOSIS — R07.89 CHEST DISCOMFORT: ICD-10-CM

## 2021-07-21 DIAGNOSIS — I10 ESSENTIAL HYPERTENSION: Primary | ICD-10-CM

## 2021-07-21 PROCEDURE — 99214 OFFICE O/P EST MOD 30 MIN: CPT | Performed by: SPECIALIST

## 2021-07-21 RX ORDER — METOPROLOL SUCCINATE 50 MG/1
TABLET, EXTENDED RELEASE ORAL
COMMUNITY
Start: 2021-07-03

## 2021-07-21 NOTE — PROGRESS NOTES
CARDIOLOGY OFFICE NOTE    Harsha Shin MD, [address], [place], [address] [facility]  Phone [phone]; Fax [phone]  Mobile [phone]   Voice Mail [phone]    LAST OFFICE VISIT : Visit date not found  Kevin Ruth MD       ATTENTION:   This medical record was transcribed using an electronic medical records/speech recognition system. Although proofread, it may and can contain electronic, spelling and other errors. Corrections may be executed at a later time. Please feel free to contact us for any clarifications as needed. ICD-10-CM ICD-9-CM   1. Essential hypertension  I10 401.9   2. Hypercholesteremia  E78.00 272.0   3. Chest discomfort  R07.89 786.59            Azael Kirkpatrick is a 60 y.o. male with  referred for      . Cardiac risk factors: family history, dyslipidemia, diabetes mellitus, male gender, hypertension  I have personally obtained the history from the patient. HISTORY OF PRESENTING ILLNESS   From previous note:   He is a 61-year-old gentleman with past medical history of diabetes, hypertension, and dyslipidemia. He returns today to follow-up on all his cardiac testing. He had echo that demonstrated EF of 60% he had a venous duplex studies of his lower extremities with no evidence of DVT and he also had a stress test.  It was a exercise Cardiolite and he walked on the treadmill for 9 minutes he had ST depression in inferior leads but there was normal myocardial perfusion. His hemoglobin A1c is elevated and his LDL is not available to me at this time.     Continues to feel short of breath and is scheduled on 2 August to have his CT coronary angiogram.     ACTIVE PROBLEM LIST     Patient Active Problem List    Diagnosis Date Noted    Pain in testicle 10/14/2020    Impotence 10/14/2020    Type II or unspecified type diabetes mellitus with renal manifestations, not stated as uncontrolled(250.40) ([address] 07/27/2015    HTN (hypertension) 03/24/2015    Hypercholesteremia 03/24/2015    Dysthymia 03/24/2015    ADD (attention deficit disorder) 01/24/2013    Insulin dependent diabetes mellitus 01/24/2013           PAST MEDICAL HISTORY     Past Medical History:   Diagnosis Date    Diabetes ([address]     Hypercholesterolemia     Hypertension            PAST SURGICAL HISTORY     Past Surgical History:   Procedure Laterality Date    HX TONSILLECTOMY            ALLERGIES     No Known Allergies       FAMILY HISTORY     Family History   Problem Relation Age of Onset    Cancer Mother         Breast    Cancer Father         Colon    Kidney Disease Brother     Diabetes Paternal Aunt     Sickle Cell Anemia Maternal Grandmother     negative for cardiac disease       SOCIAL HISTORY     Social History     Socioeconomic History    Marital status:      Spouse name: Not on file    Number of children: Not on file    Years of education: Not on file    Highest education level: Not on file   Tobacco Use    Smoking status: Never Smoker    Smokeless tobacco: Never Used   Substance and Sexual Activity    Alcohol use: No     Alcohol/week: 0.0 standard drinks    Drug use: Never    Sexual activity: Yes     Partners: Female     Social Determinants of Health     Financial Resource Strain:     Difficulty of Paying Living Expenses:    Food Insecurity:     Worried About Running Out of Food in the Last Year:     Ran Out of Food in the Last Year:    Transportation Needs:     Lack of Transportation (Medical):      Lack of Transportation (Non-Medical):    Physical Activity:     Days of Exercise per Week:     Minutes of Exercise per Session:    Stress:     Feeling of Stress :    Social Connections:     Frequency of Communication with Friends and Family:     Frequency of Social Gatherings with Friends and Family:     Attends Judaism Services:     Active Member of Clubs or Organizations:     Attends Club or Organization Meetings:    [facility] Marital Status:          MEDICATIONS     Current Outpatient Medications   Medication Sig    metoprolol succinate (TOPROL-XL) 50 mg XL tablet TAKE 1 TABLET BY MOUTH EVERY DAY    flash glucose sensor (FREESTYLE FLACA 2 SENSOR) as directed    ascorbic acid, vitamin C, (VITAMIN C) 1,000 mg tablet Take 500 mg by mouth daily.  aspirin delayed-release 81 mg tablet Take  by mouth daily.  ergocalciferol (ERGOCALCIFEROL) 1,250 mcg (50,000 unit) capsule TAKE 1 CAPSULE BY MOUTH 1 TIME A WEEK    valsartan (DIOVAN) 80 mg tablet TAKE 2 TABLETS BY MOUTH DAILY FOR HIGH BLOOD PRESSURE    Jardiance 10 mg tablet TAKE 1 TABLET BY MOUTH EVERY DAY IN THE MORNING    b complex vitamins tablet Take 1 Tab by mouth daily.  NOVOLOG FLEXPEN 100 unit/mL inpn 8-11 units three times a day    insulin glargine 300 unit/mL (1.5 mL) inpn 35 Units by SubCUTAneous route nightly.  atorvastatin (LIPITOR) 10 mg tablet Take 10 mg by mouth daily.  atenoloL (TENORMIN) 25 mg tablet Take  by mouth. 50 mg night prior to CT and 25 mg 1 hour prior to CT    hydroCHLOROthiazide (HYDRODIURIL) 12.5 mg tablet TAKE 1 TABLET BY MOUTH EVERY DAY    metoprolol succinate (TOPROL-XL) 25 mg XL tablet Take 50 mg by mouth daily. No current facility-administered medications for this visit. I have reviewed the nurses notes, vitals, problem list, allergy list, medical history, family, social history and medications. REVIEW OF SYMPTOMS   Pertinent positive per HPI  General: Pt denies excessive weight gain or loss. Pt is able to conduct ADL's  HEENT: Denies blurred vision, headaches, hearing loss, epistaxis and difficulty swallowing. Respiratory: Denies cough, congestion, shortness of breath, ROSSI, wheezing or stridor.   Cardiovascular: Denies precordial pain, palpitations, edema or PND  Gastrointestinal: Denies poor appetite, indigestion, abdominal pain or blood in stool  Genitourinary: Denies hematuria, dysuria, increased urinary frequency  Musculoskeletal: Denies joint pain or swelling from muscles or joints  Neurologic: Denies tremor, paresthesias, headache, or sensory motor disturbance  Psychiatric: Denies confusion, insomnia, depression  Integumentray: Denies rash, itching or ulcers. Hematologic: Denies easy bruising, bleeding     PHYSICAL EXAMINATION      Vitals:    07/21/21 1546   BP: 126/68   Pulse: 66   SpO2: 98%   Weight: 227 lb (103 kg)   Height: 6' 2\" (1.88 m)     General: Well developed, in no acute distress. HEENT: No jaundice, oral mucosa moist, no oral ulcers  Neck: Supple, no stiffness, no lymphadenopathy, supple  Heart:  Normal S1/S2 negative S3 or S4. Regular, no murmur, gallop or rub, no jugular venous distention  Respiratory: Clear bilaterally x 4, no wheezing or rales  Extremities:  No edema, normal cap refill, no cyanosis. Musculoskeletal: No clubbing, no deformities  Neuro: A&Ox3, speech clear, gait stable, cooperative, no focal neurologic deficits  Skin: Skin color is normal. No rashes or lesions. Non diaphoretic, moist.          EKG: Normal sinus rhythm     DIAGNOSTIC DATA     1. LE Venous Duplex   4/6/21-No evidence of thrombosis in the right lower extremity     2. Echo   5/18/21- EF 60%     3. Stress Test   5/18/21-Cardiolite-Good functional capacity completing 9 mins of Chava Protocol.  ECG during stress with frequent PVCs along with 1mm upsloping depression within inferior leads, resulting in Sweeney Treadmill score of 4.  Normal myocardial perfusion imaging.     4. Lipids  6/28/21- , HDL 43, LDL 57, TG 62         LABORATORY DATA            Lab Results   Component Value Date/Time    WBC 14.1 (H) 10/30/2017 05:15 AM    HGB 17.0 10/30/2017 05:15 AM    HCT 50.7 (H) 10/30/2017 05:15 AM    PLATELET 316 10/85/9634 05:15 AM    MCV 86.8 10/30/2017 05:15 AM      Lab Results   Component Value Date/Time    Sodium 138 10/30/2017 05:15 AM    Potassium 3.5 10/30/2017 05:15 AM    Chloride 102 10/30/2017 05:15 AM    CO2 31 10/30/2017 05:15 AM    Anion gap 5 10/30/2017 05:15 AM    Glucose 52 (L) 10/30/2017 05:15 AM    BUN 15 10/30/2017 05:15 AM    Creatinine 1.73 (H) 10/30/2017 05:15 AM    BUN/Creatinine ratio 9 (L) 10/30/2017 05:15 AM    GFR est AA 49 (L) 10/30/2017 05:15 AM    GFR est non-AA 40 (L) 10/30/2017 05:15 AM    Calcium 8.8 10/30/2017 05:15 AM    Bilirubin, total 0.7 06/28/2021 10:13 AM    Alk. phosphatase 72 06/28/2021 10:13 AM    Protein, total 7.4 06/28/2021 10:13 AM    Albumin 4.4 06/28/2021 10:13 AM    Globulin 3.9 10/30/2017 05:15 AM    A-G Ratio 1.0 (L) 10/30/2017 05:15 AM    ALT (SGPT) 40 06/28/2021 10:13 AM           ASSESSMENT/RECOMMENDATIONS:.      1.  shortness of breath   -His shortness of breath is intermittent at this time but is very concerning with his diabetes is poorly controlled hypertension is poorly controlled and unsure what his LDL is think is at risk for heart disease. He states he is still having shortness of breath with activity-The EKG changes are concerning because he may have a equal distribution of contrast during the study and may have significant disease so I am asking that he have a coronary CT angiogram.  -We reviewed with him dosing of his metoprolol and he will not be taking atenolol. He will take 25 mg of metoprolol XL at night and 75 mg in the morning prior to his CT    2. Hypertension  -Blood pressure is at goal today 126/68    3. Dyslipidemia LDL is at goal  Lab Results   Component Value Date/Time    Cholesterol, total 114 06/28/2021 10:13 AM    HDL Cholesterol 43 06/28/2021 10:13 AM    LDL, calculated 57 06/28/2021 10:13 AM    VLDL, calculated 14 06/28/2021 10:13 AM    Triglyceride 62 06/28/2021 10:13 AM         4. Diabetes on insulin  -last Hgb A1c was 9 goal should be 6 or less    5.  ZEYAD  -Compliant with CPAP  Orders Placed This Encounter    metoprolol succinate (TOPROL-XL) 50 mg XL tablet     Sig: TAKE 1 TABLET BY MOUTH EVERY DAY    flash glucose sensor (FREESTYLE FLACA 2 SENSOR)     Sig: as directed       We discussed the expected course, resolution and complications of the diagnosis(es) in detail. Medication risks, benefits, costs, interactions, and alternatives were discussed as indicated. I advised him to contact the office if his condition worsens, changes or fails to improve as anticipated. He expressed understanding with the diagnosis(es) and plan          Follow-up and Dispositions  ·   Return in about 6 months (around 1/21/2022). I have discussed the diagnosis with  Sarah Sullivan and the intended plan as seen in the above orders. Questions were answered concerning future plans. I have discussed medication side effects and warnings with the patient as well. Thank you,  Angela Pearson MD for involving me in the care of  Sarah Sullivan. Please do not hesitate to contact me for further questions/concerns. Harsha Lawson MD, [facility] [address]  [address] [place] [address], [address] [place] [address] [address]      [phone] / [phone] Fax

## 2021-07-21 NOTE — PROGRESS NOTES
Room 2    CT sx on 8-2  Review labs     Visit Vitals  /68 (BP 1 Location: Left upper arm)   Pulse 66   Ht 6' 2\" (1.88 m)   Wt 227 lb (103 kg)   SpO2 98%   BMI 29.15 kg/m²         Chest pain: no  Shortness of breath: no  Edema: no  Palpitations: no  Dizziness: no    New diagnosis/Surgeries: no    ER/Hospitalizations: no    Refills: no

## 2021-08-02 ENCOUNTER — HOSPITAL ENCOUNTER (OUTPATIENT)
Dept: CT IMAGING | Age: 66
Discharge: HOME | End: 2021-08-02
Attending: SPECIALIST
Payer: COMMERCIAL

## 2021-08-02 VITALS — HEART RATE: 57 BPM | DIASTOLIC BLOOD PRESSURE: 71 MMHG | SYSTOLIC BLOOD PRESSURE: 138 MMHG

## 2021-08-02 DIAGNOSIS — I10 ESSENTIAL HYPERTENSION: ICD-10-CM

## 2021-08-02 DIAGNOSIS — R06.02 SHORTNESS OF BREATH: ICD-10-CM

## 2021-08-02 LAB — CREAT BLD-MCNC: 2.1 MG/DL (ref 0.6–1.3)

## 2021-08-02 PROCEDURE — 74011000636 HC RX REV CODE- 636: Performed by: RADIOLOGY

## 2021-08-02 PROCEDURE — 75574 CT ANGIO HRT W/3D IMAGE: CPT

## 2021-08-02 PROCEDURE — 74011250637 HC RX REV CODE- 250/637: Performed by: STUDENT IN AN ORGANIZED HEALTH CARE EDUCATION/TRAINING PROGRAM

## 2021-08-02 PROCEDURE — 82565 ASSAY OF CREATININE: CPT

## 2021-08-02 PROCEDURE — 75574 CT ANGIO HRT W/3D IMAGE: CPT | Performed by: INTERNAL MEDICINE

## 2021-08-02 RX ORDER — IODIXANOL 320 MG/ML
200 INJECTION, SOLUTION INTRAVASCULAR
Status: COMPLETED | OUTPATIENT
Start: 2021-08-02 | End: 2021-08-02

## 2021-08-02 RX ORDER — NITROGLYCERIN 0.4 MG/1
0.4 TABLET SUBLINGUAL AS NEEDED
Status: DISCONTINUED | OUTPATIENT
Start: 2021-08-02 | End: 2021-08-06 | Stop reason: HOSPADM

## 2021-08-02 RX ADMIN — NITROGLYCERIN 0.4 MG: 0.4 TABLET, ORALLY DISINTEGRATING SUBLINGUAL at 10:09

## 2021-08-02 RX ADMIN — IODIXANOL 120 ML: 320 INJECTION, SOLUTION INTRAVASCULAR at 09:48

## 2021-08-02 NOTE — PROGRESS NOTES
Patient here today for CT coronary scan    Received call from CT department asking for administration of nitro SL    Patients initial blood pressure is 140/85, and HR is 53    Nitro SL administered x 1, scan completed    Patient tolerated procedure well, cardiac leads and IV removed. Patient educated regarding possible dizziness/lightheadedness post nitro SL, and to get up and move slowly. Patient voiced understanding, all questions answered. Ambulatory to the exit with steady gait noted.

## 2021-08-04 NOTE — PROGRESS NOTES
The CT scan of your coronary arteries look good with no significant blockages and this is great news. I hope all is well. Take care . also note that your calcium score is 0 and this is equally great news.

## 2021-08-04 NOTE — PROGRESS NOTES
Your kidney function is down slightly and I would like for you to see a nephrologist if you do not have one already. I would recommend seeing Dr. Terrence Esparza. All the best and take care and stay healthyMark

## 2021-10-06 ENCOUNTER — TELEPHONE (OUTPATIENT)
Dept: CARDIOLOGY CLINIC | Age: 66
End: 2021-10-06

## 2021-10-06 NOTE — TELEPHONE ENCOUNTER
Left message requesting a call back to reschedule upcoming appointment on 10/20. Please reschedule to next available. Thanks.

## 2022-02-14 ENCOUNTER — APPOINTMENT (OUTPATIENT)
Dept: GENERAL RADIOLOGY | Age: 67
End: 2022-02-14
Attending: NURSE PRACTITIONER
Payer: COMMERCIAL

## 2022-02-14 ENCOUNTER — HOSPITAL ENCOUNTER (EMERGENCY)
Age: 67
Discharge: HOME | End: 2022-02-14
Attending: EMERGENCY MEDICINE
Payer: COMMERCIAL

## 2022-02-14 VITALS
OXYGEN SATURATION: 97 % | TEMPERATURE: 97.7 F | HEIGHT: 74 IN | HEART RATE: 82 BPM | WEIGHT: 213 LBS | SYSTOLIC BLOOD PRESSURE: 150 MMHG | DIASTOLIC BLOOD PRESSURE: 88 MMHG | RESPIRATION RATE: 18 BRPM | BODY MASS INDEX: 27.34 KG/M2

## 2022-02-14 DIAGNOSIS — M25.559 HIP PAIN: Primary | ICD-10-CM
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
## 2022-02-14 PROCEDURE — 99281 EMR DPT VST MAYX REQ PHY/QHP: CPT

## 2022-02-14 PROCEDURE — 73502 X-RAY EXAM HIP UNI 2-3 VIEWS: CPT

## 2022-02-14 RX ORDER — METHOCARBAMOL 750 MG/1
750 TABLET, FILM COATED ORAL
Qty: 21 TABLET | Refills: 0 | Status: SHIPPED | OUTPATIENT
Start: 2022-02-14 | End: 2022-02-21

## 2022-02-14 RX ORDER — DICLOFENAC SODIUM 75 MG/1
75 TABLET, DELAYED RELEASE ORAL
Qty: 30 TABLET | Refills: 0 | Status: SHIPPED | OUTPATIENT
Start: 2022-02-14 | End: 2022-03-01

## 2022-02-14 NOTE — ED TRIAGE NOTES
Pt reports tripping over curb and striking his right hip and right shoulder. Denies numbness or tingling. Able to extend arms and ambulatory with steady gait.

## 2022-02-15 NOTE — ED PROVIDER NOTES
28-year-old male presents to the ER today for evaluation of acute right-sided hip pain after a mechanical ground-level fall that occurred earlier today. Patient was a slight limp due to pain bearing weight on the right lower extremity. He denies any back pain or radiation of his pain below the level of his buttock. Past Medical History:   Diagnosis Date    Diabetes ([address]     Hypercholesterolemia     Hypertension        Past Surgical History:   Procedure Laterality Date    HX TONSILLECTOMY           Family History:   Problem Relation Age of Onset    Cancer Mother         Breast    Cancer Father         Colon    Kidney Disease Brother     Diabetes Paternal Aunt     Sickle Cell Anemia Maternal Grandmother        Social History     Socioeconomic History    Marital status:      Spouse name: Not on file    Number of children: Not on file    Years of education: Not on file    Highest education level: Not on file   Occupational History    Not on file   Tobacco Use    Smoking status: Never Smoker    Smokeless tobacco: Never Used   Substance and Sexual Activity    Alcohol use: No     Alcohol/week: 0.0 standard drinks    Drug use: Never    Sexual activity: Yes     Partners: Female   Other Topics Concern    Not on file   Social History Narrative    Not on file     Social Determinants of Health     Financial Resource Strain:     Difficulty of Paying Living Expenses: Not on file   Food Insecurity:     Worried About Running Out of Food in the Last Year: Not on file    Xavier of Food in the Last Year: Not on file   Transportation Needs:     Lack of Transportation (Medical): Not on file    Lack of Transportation (Non-Medical):  Not on file   Physical Activity:     Days of Exercise per Week: Not on file    Minutes of Exercise per Session: Not on file   Stress:     Feeling of Stress : Not on file   Social Connections:     Frequency of Communication with Friends and Family: Not on file  Frequency of Social Gatherings with Friends and Family: Not on file    Attends [employer] Services: Not on file    Active Member of Clubs or Organizations: Not on file    Attends Club or Organization Meetings: Not on file    Marital Status: Not on file   Intimate Partner Violence:     Fear of Current or Ex-Partner: Not on file    Emotionally Abused: Not on file    Physically Abused: Not on file    Sexually Abused: Not on file   Housing Stability:     Unable to Pay for Housing in the Last Year: Not on file    Number of [place] in the Last Year: Not on file    Unstable Housing in the Last Year: Not on file         ALLERGIES: Patient has no known allergies. Review of Systems   Constitutional: Negative for fever. HENT: Negative for sore throat. Eyes: Negative for visual disturbance. Respiratory: Negative for shortness of breath. Cardiovascular: Negative for palpitations. Gastrointestinal: Negative for vomiting. Genitourinary: Negative for dysuria. Musculoskeletal: Positive for arthralgias. Skin: Negative for rash. Neurological: Negative for dizziness. Psychiatric/Behavioral: Negative for dysphoric mood. Vitals:    02/14/22 1812   BP: (!) 150/88   Pulse: 82   Resp: 18   Temp: 97.7 °F (36.5 °C)   SpO2: 97%   Weight: 96.6 kg (213 lb)   Height: 6' 2\" (1.88 m)            Physical Exam  Vitals and nursing note reviewed. Constitutional:       Appearance: Normal appearance. HENT:      Head: Normocephalic. Eyes:      Extraocular Movements: Extraocular movements intact. Cardiovascular:      Rate and Rhythm: Normal rate. Pulmonary:      Effort: Pulmonary effort is normal.   Abdominal:      General: There is no distension. Musculoskeletal:         General: Normal range of motion. Right shoulder: Normal. No tenderness. Normal range of motion. Cervical back: Neck supple. Comments: Tenderness to palpation along right ischium and buttock musculature.    Skin: General: Skin is warm and dry. Coloration: Skin is not pale. Neurological:      Mental Status: He is alert and oriented to person, place, and time. Gait: Gait normal.   Psychiatric:         Behavior: Behavior normal.          MDM     VITAL SIGNS:  No data found. LABS:  No results found for this or any previous visit (from the past 6 hour(s)). IMAGING:  XR HIP RT W OR WO PELV 2-3 VWS   Final Result   No acute abnormality. Medications During Visit:  Medications - No data to display      DECISION MAKING:  Gin Maddox is a 73 y.o. male who comes in as above. Benign x-ray. Recommended supportive care with NSAIDs, muscle relaxers, stretching, and ROM exercises. The clinical decision making for this encounter included ordering and interpreting the above diagnostic tests with comparison to prior studies that are within our EMR. Past medical and surgical histories were reviewed, as were records from recent outpatient and emergency department visits. The above results discussed and reviewed with the patient. Patient verbalized understanding of the care plan, including any changes to current outpatient medication regimen, discussed disease process, symptom control, and follow-up care. Return precautions reviewed. IMPRESSION:  1. Hip pain    2. Fall, initial encounter        DISPOSITION:  Discharged      Discharge Medication List as of 2/14/2022  9:35 PM      START taking these medications    Details   diclofenac EC (VOLTAREN) 75 mg EC tablet Take 1 Tablet by mouth two (2) times daily as needed for Pain for up to 15 days. , Normal, Disp-30 Tablet, R-0      methocarbamoL (ROBAXIN) 750 mg tablet Take 1 Tablet by mouth three (3) times daily as needed for Muscle Spasm(s) for up to 7 days. , Normal, Disp-21 Tablet, R-0         CONTINUE these medications which have NOT CHANGED    Details   metoprolol succinate (TOPROL-XL) 50 mg XL tablet TAKE 1 TABLET BY MOUTH EVERY DAY, Historical Med      flash glucose sensor (FREESTYLE FLACA 2 SENSOR) as directed, Historical Med      atenoloL (TENORMIN) 25 mg tablet Take  by mouth. 50 mg night prior to CT and 25 mg 1 hour prior to CT, Historical Med      ascorbic acid, vitamin C, (VITAMIN C) 1,000 mg tablet Take 500 mg by mouth daily. , Historical Med      aspirin delayed-release 81 mg tablet Take  by mouth daily. , Historical Med      ergocalciferol (ERGOCALCIFEROL) 1,250 mcg (50,000 unit) capsule TAKE 1 CAPSULE BY MOUTH 1 TIME A WEEK, Historical Med      valsartan (DIOVAN) 80 mg tablet TAKE 2 TABLETS BY MOUTH DAILY FOR HIGH BLOOD PRESSURE, Historical Med      Jardiance 10 mg tablet TAKE 1 TABLET BY MOUTH EVERY DAY IN THE MORNING, Historical Med, JUAN      b complex vitamins tablet Take 1 Tab by mouth daily. , Historical Med      NOVOLOG FLEXPEN 100 unit/mL inpn 8-11 units three times a day, Historical Med, R-1, JUAN      insulin glargine 300 unit/mL (1.5 mL) inpn 35 Units by SubCUTAneous route nightly., Historical Med      atorvastatin (LIPITOR) 10 mg tablet Take 10 mg by mouth daily. , Historical Med, R-2              Follow-up Information     Follow up With Specialties Details Why Contact Info    Kyle Galicia MD [facility] Medicine Schedule an appointment as soon as possible for a visit  As needed [place] [address]  [address]  [phone]              The patient is asked to follow-up with their primary care provider in the next several days. They are to call tomorrow for an appointment. The patient is asked to return promptly for any increased concerns or worsening of symptoms. They can return to this emergency department or any other emergency department.     Procedures

## 2022-03-10 ENCOUNTER — OFFICE VISIT (OUTPATIENT)
Dept: CARDIOLOGY CLINIC | Age: 67
End: 2022-03-10
Payer: MEDICARE

## 2022-03-10 VITALS
SYSTOLIC BLOOD PRESSURE: 128 MMHG | WEIGHT: 216 LBS | HEART RATE: 80 BPM | DIASTOLIC BLOOD PRESSURE: 68 MMHG | HEIGHT: 74 IN | BODY MASS INDEX: 27.72 KG/M2

## 2022-03-10 DIAGNOSIS — E78.00 HYPERCHOLESTEREMIA: ICD-10-CM

## 2022-03-10 DIAGNOSIS — I10 ESSENTIAL HYPERTENSION: Primary | ICD-10-CM

## 2022-03-10 PROCEDURE — G8536 NO DOC ELDER MAL SCRN: HCPCS | Performed by: SPECIALIST

## 2022-03-10 PROCEDURE — G8752 SYS BP LESS 140: HCPCS | Performed by: SPECIALIST

## 2022-03-10 PROCEDURE — 1101F PT FALLS ASSESS-DOCD LE1/YR: CPT | Performed by: SPECIALIST

## 2022-03-10 PROCEDURE — 3017F COLORECTAL CA SCREEN DOC REV: CPT | Performed by: SPECIALIST

## 2022-03-10 PROCEDURE — G8419 CALC BMI OUT NRM PARAM NOF/U: HCPCS | Performed by: SPECIALIST

## 2022-03-10 PROCEDURE — G9717 DOC PT DX DEP/BP F/U NT REQ: HCPCS | Performed by: SPECIALIST

## 2022-03-10 PROCEDURE — G0463 HOSPITAL OUTPT CLINIC VISIT: HCPCS | Performed by: SPECIALIST

## 2022-03-10 PROCEDURE — G8427 DOCREV CUR MEDS BY ELIG CLIN: HCPCS | Performed by: SPECIALIST

## 2022-03-10 PROCEDURE — G8754 DIAS BP LESS 90: HCPCS | Performed by: SPECIALIST

## 2022-03-10 PROCEDURE — 99214 OFFICE O/P EST MOD 30 MIN: CPT | Performed by: SPECIALIST

## 2022-03-10 RX ORDER — ATORVASTATIN CALCIUM 20 MG/1
TABLET, FILM COATED ORAL DAILY
COMMUNITY

## 2022-03-10 RX ORDER — SEMAGLUTIDE 1.34 MG/ML
2 INJECTION, SOLUTION SUBCUTANEOUS
COMMUNITY

## 2022-03-10 NOTE — PROGRESS NOTES
CARDIOLOGY OFFICE NOTE    Harsha Hadley MD, [address], [place], [address] [facility]  Phone [phone]; Fax [phone]  Mobile [phone]   Voice Mail [phone]    LAST OFFICE VISIT : Visit date not found  Vale Coppola MD       ATTENTION:   This medical record was transcribed using an electronic medical records/speech recognition system. Although proofread, it may and can contain electronic, spelling and other errors. Corrections may be executed at a later time. Please feel free to contact us for any clarifications as needed. No diagnosis found. Marielle Magdaleno is a 73 y.o. male with  referred for      . Cardiac risk factors: family history, dyslipidemia, diabetes mellitus, male gender, hypertension  I have personally obtained the history from the patient. HISTORY OF PRESENTING ILLNESS   From previous note:   He is a 72-year-old gentleman with past medical history of diabetes, hypertension, and dyslipidemia. He returns today to follow-up on all his cardiac testing. He had echo that demonstrated EF of 60% he had a venous duplex studies of his lower extremities with no evidence of DVT and he also had a stress test.  It was a exercise Cardiolite and he walked on the treadmill for 9 minutes he had ST depression in inferior leads but there was normal myocardial perfusion. His hemoglobin A1c is elevated and his LDL is not available to me at this time.     Continues to feel short of breath and is scheduled on 2 August to have his CT coronary angiogram.     ACTIVE PROBLEM LIST     Patient Active Problem List    Diagnosis Date Noted    Pain in testicle 10/14/2020    Impotence 10/14/2020    Type II or unspecified type diabetes mellitus with renal manifestations, not stated as uncontrolled(250.40) ([facility] [address] 07/27/2015    HTN (hypertension) 03/24/2015    Hypercholesteremia 03/24/2015    Dysthymia 03/24/2015    ADD (attention deficit disorder) 01/24/2013    Insulin dependent diabetes mellitus 01/24/2013           PAST MEDICAL HISTORY     Past Medical History:   Diagnosis Date    [address] [address]     Hypercholesterolemia     Hypertension            PAST SURGICAL HISTORY     Past Surgical History:   Procedure Laterality Date    HX TONSILLECTOMY            ALLERGIES     No Known Allergies       FAMILY HISTORY     Family History   Problem Relation Age of Onset    Cancer Mother         Breast    Cancer Father         Colon    Kidney Disease Brother     Diabetes Paternal Aunt     Sickle Cell Anemia Maternal Grandmother     negative for cardiac disease       SOCIAL HISTORY     Social History     Socioeconomic History    Marital status:    Tobacco Use    Smoking status: Never Smoker    Smokeless tobacco: Never Used   Substance and Sexual Activity    Alcohol use: No     Alcohol/week: 0.0 standard drinks    Drug use: Never    Sexual activity: Yes     Partners: Female         MEDICATIONS     Current Outpatient Medications   Medication Sig    metoprolol succinate (TOPROL-XL) 50 mg XL tablet TAKE 1 TABLET BY MOUTH EVERY DAY    flash glucose sensor (FREESTYLE FLACA 2 SENSOR) as directed    atenoloL (TENORMIN) 25 mg tablet Take  by mouth. 50 mg night prior to CT and 25 mg 1 hour prior to CT    ascorbic acid, vitamin C, (VITAMIN C) 1,000 mg tablet Take 500 mg by mouth daily.  aspirin delayed-release 81 mg tablet Take  by mouth daily.  ergocalciferol (ERGOCALCIFEROL) 1,250 mcg (50,000 unit) capsule TAKE 1 CAPSULE BY MOUTH 1 TIME A WEEK    valsartan (DIOVAN) 80 mg tablet TAKE 2 TABLETS BY MOUTH DAILY FOR HIGH BLOOD PRESSURE    Jardiance 10 mg tablet TAKE 1 TABLET BY MOUTH EVERY DAY IN THE MORNING    b complex vitamins tablet Take 1 Tab by mouth daily.  NOVOLOG FLEXPEN 100 unit/mL inpn 8-11 units three times a day    insulin glargine 300 unit/mL (1.5 mL) inpn 35 Units by SubCUTAneous route nightly.     atorvastatin (LIPITOR) 10 mg tablet Take 10 mg by mouth daily. No current facility-administered medications for this visit. I have reviewed the nurses notes, vitals, problem list, allergy list, medical history, family, social history and medications. REVIEW OF SYMPTOMS   Pertinent positive per HPI  General: Pt denies excessive weight gain or loss. Pt is able to conduct ADL's  HEENT: Denies blurred vision, headaches, hearing loss, epistaxis and difficulty swallowing. Respiratory: Denies cough, congestion, shortness of breath, ROSSI, wheezing or stridor. Cardiovascular: Denies precordial pain, palpitations, edema or PND  Gastrointestinal: Denies poor appetite, indigestion, abdominal pain or blood in stool  Genitourinary: Denies hematuria, dysuria, increased urinary frequency  Musculoskeletal: Denies joint pain or swelling from muscles or joints  Neurologic: Denies tremor, paresthesias, headache, or sensory motor disturbance  Psychiatric: Denies confusion, insomnia, depression  Integumentray: Denies rash, itching or ulcers. Hematologic: Denies easy bruising, bleeding     PHYSICAL EXAMINATION      There were no vitals filed for this visit. General: Well developed, in no acute distress. HEENT: No jaundice, oral mucosa moist, no oral ulcers  Neck: Supple, no stiffness, no lymphadenopathy, supple  Heart:  Normal S1/S2 negative S3 or S4. Regular, no murmur, gallop or rub, no jugular venous distention  Respiratory: Clear bilaterally x 4, no wheezing or rales  Extremities:  No edema, normal cap refill, no cyanosis. Musculoskeletal: No clubbing, no deformities  Neuro: A&Ox3, speech clear, gait stable, cooperative, no focal neurologic deficits  Skin: Skin color is normal. No rashes or lesions. Non diaphoretic, moist.          EKG: Normal sinus rhythm     DIAGNOSTIC DATA     1. LE Venous Duplex   4/6/21-No evidence of thrombosis in the right lower extremity     2. Echo   5/18/21- EF 60%     3.  Stress Test   5/18/21-Cardiolite-Good functional capacity completing 9 mins of Chava Protocol.  ECG during stress with frequent PVCs along with 1mm upsloping depression within inferior leads, resulting in Sweeney Treadmill score of 4.  Normal myocardial perfusion imaging. 4. Lipids  6/28/21- , HDL 43, LDL 57, TG 62  11/22/21- , HDL 50, LDL 37,     5. Coronary CT  8/2/21-no significant blockages, calcium score is 0         LABORATORY DATA            Lab Results   Component Value Date/Time    WBC 14.1 (H) 10/30/2017 05:15 AM    HGB 17.0 10/30/2017 05:15 AM    HCT 50.7 (H) 10/30/2017 05:15 AM    PLATELET 327 08/34/2683 05:15 AM    MCV 86.8 10/30/2017 05:15 AM      Lab Results   Component Value Date/Time    Sodium 138 10/30/2017 05:15 AM    Potassium 3.5 10/30/2017 05:15 AM    Chloride 102 10/30/2017 05:15 AM    CO2 31 10/30/2017 05:15 AM    Anion gap 5 10/30/2017 05:15 AM    Glucose 52 (L) 10/30/2017 05:15 AM    BUN 15 10/30/2017 05:15 AM    Creatinine 1.73 (H) 10/30/2017 05:15 AM    BUN/Creatinine ratio 9 (L) 10/30/2017 05:15 AM    GFR est AA 49 (L) 10/30/2017 05:15 AM    GFR est non-AA 40 (L) 10/30/2017 05:15 AM    Calcium 8.8 10/30/2017 05:15 AM    Bilirubin, total 0.7 06/28/2021 10:13 AM    Alk. phosphatase 72 06/28/2021 10:13 AM    Protein, total 7.4 06/28/2021 10:13 AM    Albumin 4.4 06/28/2021 10:13 AM    Globulin 3.9 10/30/2017 05:15 AM    A-G Ratio 1.0 (L) 10/30/2017 05:15 AM    ALT (SGPT) 40 06/28/2021 10:13 AM           ASSESSMENT/RECOMMENDATIONS:.      1.  shortness of breath   -No complaints of shortness of breath  -Last echo revealed normal EF  -Encourage exercise    2. Hypertension  -Blood pressure is at goal today 128/68  -Reduce sodium intake    3.   Dyslipidemia LDL is at goal at 57  Lab Results   Component Value Date/Time    Cholesterol, total 114 06/28/2021 10:13 AM    HDL Cholesterol 43 06/28/2021 10:13 AM    LDL, calculated 57 06/28/2021 10:13 AM    VLDL, calculated 14 06/28/2021 10:13 AM    Triglyceride 62 06/28/2021 10:13 AM         4. Diabetes on insulin  -last Hgb A1c was 7.7 last 1 I was aware of was 933 a great job of improving his diabetes    5. ZEYAD  -Compliant with CPAP  No orders of the defined types were placed in this encounter. We discussed the expected course, resolution and complications of the diagnosis(es) in detail. Medication risks, benefits, costs, interactions, and alternatives were discussed as indicated. I advised him to contact the office if his condition worsens, changes or fails to improve as anticipated. He expressed understanding with the diagnosis(es) and plan          Follow-up and Dispositions  ·   Return in about 6 months (around 9/10/2022). I have discussed the diagnosis with  Anette Ley and the intended plan as seen in the above orders. Questions were answered concerning future plans. I have discussed medication side effects and warnings with the patient as well. Thank you,  Yumiko Coto MD for involving me in the care of  Anette Ley. Please do not hesitate to contact me for further questions/concerns. Harsha Lawson MD, [address] [place] [address] Rd[address]  [address]      [facility], [address] [place] [address], [address] [place] [address] Viola [place].      [phone] / [phone] Fax

## 2022-03-18 PROBLEM — N52.9 IMPOTENCE: Status: ACTIVE | Noted: 2020-10-14

## 2022-03-20 PROBLEM — N50.819 PAIN IN TESTICLE: Status: ACTIVE | Noted: 2020-10-14

## 2022-09-11 NOTE — PROGRESS NOTES
CARDIOLOGY OFFICE NOTE    Harsha Phelan MD, [address], [place], [address] [facility]  Phone [phone]; Fax [phone]  Mobile [phone]   Voice Mail [phone]    LAST OFFICE VISIT : Visit date not found  ART Virgen       ATTENTION:   This medical record was transcribed using an electronic medical records/speech recognition system. Although proofread, it may and can contain electronic, spelling and other errors. Corrections may be executed at a later time. Please feel free to contact us for any clarifications as needed. ICD-10-CM ICD-9-CM   1. Essential hypertension  I10 401.9   2. Chest discomfort  R07.89 786.59            Alphonso Silva is a 73 y.o. male with  referred for      . Cardiac risk factors: family history, dyslipidemia, diabetes mellitus, male gender, hypertension  I have personally obtained the history from the patient. HISTORY OF PRESENTING ILLNESS   From previous note:   He is a 80-year-old gentleman with past medical history of diabetes, hypertension, and dyslipidemia. He returns today to follow-up on all his cardiac testing. He had echo that demonstrated EF of 60% he had a venous duplex studies of his lower extremities with no evidence of DVT and he also had a stress test.  It was a exercise Cardiolite and he walked on the treadmill for 9 minutes he had ST depression in inferior leads but there was normal myocardial perfusion. His hemoglobin A1c is elevated and his LDL is not available to me at this time.     Continues to feel short of breath and is scheduled on 2 August to have his CT coronary angiogram.     ACTIVE PROBLEM LIST     Patient Active Problem List    Diagnosis Date Noted    Pain in testicle 10/14/2020    Impotence 10/14/2020    Type II or unspecified type diabetes mellitus with renal manifestations, not stated as uncontrolled(250.40) ([facility] [address] 07/27/2015    HTN (hypertension) 03/24/2015 Hypercholesteremia 03/24/2015    Dysthymia 03/24/2015    ADD (attention deficit disorder) 01/24/2013    Insulin dependent diabetes mellitus 01/24/2013           PAST MEDICAL HISTORY     Past Medical History:   Diagnosis Date    Diabetes ([address]     Hypercholesterolemia     Hypertension            PAST SURGICAL HISTORY     Past Surgical History:   Procedure Laterality Date    HX TONSILLECTOMY            ALLERGIES     No Known Allergies       FAMILY HISTORY     Family History   Problem Relation Age of Onset    Cancer Mother         Breast    Cancer Father         Colon    Kidney Disease Brother     Diabetes Paternal Aunt     Sickle Cell Anemia Maternal Grandmother     negative for cardiac disease       SOCIAL HISTORY     Social History     Socioeconomic History    Marital status:    Tobacco Use    Smoking status: Never    Smokeless tobacco: Never   Substance and Sexual Activity    Alcohol use: No     Alcohol/week: 0.0 standard drinks    Drug use: Never    Sexual activity: Yes     Partners: Female         MEDICATIONS     Current Outpatient Medications   Medication Sig    atorvastatin (Lipitor) 20 mg tablet Take  by mouth daily. empagliflozin (Jardiance) 25 mg tablet Take  by mouth daily. semaglutide (Ozempic) 1 mg/dose (2 mg/1.5 mL) sub-q pen 1 mg by SubCUTAneous route every seven (7) days. metoprolol succinate (TOPROL-XL) 50 mg XL tablet TAKE 1 TABLET BY MOUTH EVERY DAY    flash glucose sensor (FREESTYLE FLACA 2 SENSOR) as directed    ascorbic acid, vitamin C, (VITAMIN C) 1,000 mg tablet Take 500 mg by mouth daily. aspirin delayed-release 81 mg tablet Take  by mouth daily. ergocalciferol (ERGOCALCIFEROL) 1,250 mcg (50,000 unit) capsule TAKE 1 CAPSULE BY MOUTH 1 TIME A WEEK    valsartan (DIOVAN) 80 mg tablet TAKE 2 TABLETS BY MOUTH DAILY FOR HIGH BLOOD PRESSURE    insulin glargine 300 unit/mL (1.5 mL) inpn 35 Units by SubCUTAneous route nightly.      No current facility-administered medications for this visit. I have reviewed the nurses notes, vitals, problem list, allergy list, medical history, family, social history and medications. REVIEW OF SYMPTOMS   Pertinent positive per HPI  General: Pt denies excessive weight gain or loss. Pt is able to conduct ADL's  HEENT: Denies blurred vision, headaches, hearing loss, epistaxis and difficulty swallowing. Respiratory: Denies cough, congestion, shortness of breath, ROSSI, wheezing or stridor. Cardiovascular: Denies precordial pain, palpitations, edema or PND  Gastrointestinal: Denies poor appetite, indigestion, abdominal pain or blood in stool  Genitourinary: Denies hematuria, dysuria, increased urinary frequency  Musculoskeletal: Denies joint pain or swelling from muscles or joints  Neurologic: Denies tremor, paresthesias, headache, or sensory motor disturbance  Psychiatric: Denies confusion, insomnia, depression  Integumentray: Denies rash, itching or ulcers. Hematologic: Denies easy bruising, bleeding     PHYSICAL EXAMINATION      There were no vitals filed for this visit. General: Well developed, in no acute distress. HEENT: No jaundice, oral mucosa moist, no oral ulcers  Neck: Supple, no stiffness, no lymphadenopathy, supple  Heart:  Normal S1/S2 negative S3 or S4. Regular, no murmur, gallop or rub, no jugular venous distention  Respiratory: Clear bilaterally x 4, no wheezing or rales  Extremities:  No edema, normal cap refill, no cyanosis. Musculoskeletal: No clubbing, no deformities  Neuro: A&Ox3, speech clear, gait stable, cooperative, no focal neurologic deficits  Skin: Skin color is normal. No rashes or lesions. Non diaphoretic, moist.          EKG: Normal sinus rhythm     DIAGNOSTIC DATA     1. LE Venous Duplex   4/6/21-No evidence of thrombosis in the right lower extremity     2. Echo   5/18/21- EF 60%     3. Stress Test   5/18/21-Cardiolite-Good functional capacity completing 9 mins of Chava Protocol.   ECG during stress with frequent PVCs along with 1mm upsloping depression within inferior leads, resulting in Sweeney Treadmill score of 4. Normal myocardial perfusion imaging. 4. Lipids  6/28/21- , HDL 43, LDL 57, TG 62  11/22/21- , HDL 50, LDL 37,   7/8/22- , HDL 51, LDL 52,     5. Coronary CT  8/2/21-no significant blockages, calcium score is 0         LABORATORY DATA            Lab Results   Component Value Date/Time    WBC 14.1 (H) 10/30/2017 05:15 AM    HGB 17.0 10/30/2017 05:15 AM    HCT 50.7 (H) 10/30/2017 05:15 AM    PLATELET 822 49/90/4450 05:15 AM    MCV 86.8 10/30/2017 05:15 AM      Lab Results   Component Value Date/Time    Sodium 138 10/30/2017 05:15 AM    Potassium 3.5 10/30/2017 05:15 AM    Chloride 102 10/30/2017 05:15 AM    CO2 31 10/30/2017 05:15 AM    Anion gap 5 10/30/2017 05:15 AM    Glucose 52 (L) 10/30/2017 05:15 AM    BUN 15 10/30/2017 05:15 AM    Creatinine 1.73 (H) 10/30/2017 05:15 AM    BUN/Creatinine ratio 9 (L) 10/30/2017 05:15 AM    GFR est AA 49 (L) 10/30/2017 05:15 AM    GFR est non-AA 40 (L) 10/30/2017 05:15 AM    Calcium 8.8 10/30/2017 05:15 AM    Bilirubin, total 0.7 06/28/2021 10:13 AM    Alk. phosphatase 72 06/28/2021 10:13 AM    Protein, total 7.4 06/28/2021 10:13 AM    Albumin 4.4 06/28/2021 10:13 AM    Globulin 3.9 10/30/2017 05:15 AM    A-G Ratio 1.0 (L) 10/30/2017 05:15 AM    ALT (SGPT) 40 06/28/2021 10:13 AM           ASSESSMENT/RECOMMENDATIONS:.      1.  shortness of breath   -No complaints of shortness of breath  -Last echo revealed normal EF  -Encourage exercise    2. Hypertension  -Blood pressure is at goal today 128/68  -Reduce sodium intake    3. Dyslipidemia   -LDL is at goal at 57  Lab Results   Component Value Date/Time    Cholesterol, total 114 06/28/2021 10:13 AM    HDL Cholesterol 43 06/28/2021 10:13 AM    LDL, calculated 57 06/28/2021 10:13 AM    VLDL, calculated 14 06/28/2021 10:13 AM    Triglyceride 62 06/28/2021 10:13 AM       4.   IDDM  -last Hgb A1c was 7.7 last 1 I was aware of was 933 a great job of improving his diabetes    5. ZEYAD  -Compliant with CPAP  No orders of the defined types were placed in this encounter. We discussed the expected course, resolution and complications of the diagnosis(es) in detail. Medication risks, benefits, costs, interactions, and alternatives were discussed as indicated. I advised him to contact the office if his condition worsens, changes or fails to improve as anticipated. He expressed understanding with the diagnosis(es) and plan          Follow-up and Dispositions    Return in about 6 months (around 3/12/2023). I have discussed the diagnosis with  Paris Snow and the intended plan as seen in the above orders. Questions were answered concerning future plans. I have discussed medication side effects and warnings with the patient as well. Thank you,  ART David for involving me in the care of  Paris Snow. Please do not hesitate to contact me for further questions/concerns. Harsha Lawson MD, [address] [place] [address] Rd., [address]      [facility], [address] [place] [address]      [phone] / [phone] Fax

## 2022-09-11 NOTE — PATIENT INSTRUCTIONS

## 2022-09-12 ENCOUNTER — OFFICE VISIT (OUTPATIENT)
Dept: CARDIOLOGY CLINIC | Age: 67
End: 2022-09-12

## 2022-09-12 DIAGNOSIS — R07.89 CHEST DISCOMFORT: ICD-10-CM

## 2022-09-12 DIAGNOSIS — I10 ESSENTIAL HYPERTENSION: Primary | ICD-10-CM

## 2022-09-12 NOTE — LETTER
9/14/2022    Patient: Sonia Blevins   YOB: 1955   Date of Visit: 9/12/2022     Luz Reis [facility] [address] [address]  Via Fax: [phone]    Dear ART Reis,      Thank you for referring Mr. Sonia Blevins to [facility] for evaluation. My notes for this consultation are attached. If you have questions, please do not hesitate to call me. I look forward to following your patient along with you.       Sincerely,    Fernando Mohamud MD

## 2022-09-30 ENCOUNTER — OFFICE VISIT (OUTPATIENT)
Dept: CARDIOLOGY CLINIC | Age: 67
End: 2022-09-30
Payer: MEDICARE

## 2022-09-30 VITALS
WEIGHT: 207 LBS | HEIGHT: 74 IN | BODY MASS INDEX: 26.56 KG/M2 | OXYGEN SATURATION: 99 % | DIASTOLIC BLOOD PRESSURE: 86 MMHG | HEART RATE: 79 BPM | SYSTOLIC BLOOD PRESSURE: 134 MMHG

## 2022-09-30 DIAGNOSIS — I10 ESSENTIAL HYPERTENSION: ICD-10-CM

## 2022-09-30 DIAGNOSIS — E78.00 HYPERCHOLESTEREMIA: ICD-10-CM

## 2022-09-30 DIAGNOSIS — R07.89 CHEST DISCOMFORT: Primary | ICD-10-CM

## 2022-09-30 PROCEDURE — G8754 DIAS BP LESS 90: HCPCS | Performed by: SPECIALIST

## 2022-09-30 PROCEDURE — G8427 DOCREV CUR MEDS BY ELIG CLIN: HCPCS | Performed by: SPECIALIST

## 2022-09-30 PROCEDURE — 93010 ELECTROCARDIOGRAM REPORT: CPT | Performed by: SPECIALIST

## 2022-09-30 PROCEDURE — 1123F ACP DISCUSS/DSCN MKR DOCD: CPT | Performed by: SPECIALIST

## 2022-09-30 PROCEDURE — 3017F COLORECTAL CA SCREEN DOC REV: CPT | Performed by: SPECIALIST

## 2022-09-30 PROCEDURE — 93005 ELECTROCARDIOGRAM TRACING: CPT | Performed by: SPECIALIST

## 2022-09-30 PROCEDURE — G8417 CALC BMI ABV UP PARAM F/U: HCPCS | Performed by: SPECIALIST

## 2022-09-30 PROCEDURE — G0463 HOSPITAL OUTPT CLINIC VISIT: HCPCS | Performed by: SPECIALIST

## 2022-09-30 PROCEDURE — 1101F PT FALLS ASSESS-DOCD LE1/YR: CPT | Performed by: SPECIALIST

## 2022-09-30 PROCEDURE — G8536 NO DOC ELDER MAL SCRN: HCPCS | Performed by: SPECIALIST

## 2022-09-30 PROCEDURE — G9717 DOC PT DX DEP/BP F/U NT REQ: HCPCS | Performed by: SPECIALIST

## 2022-09-30 PROCEDURE — 99214 OFFICE O/P EST MOD 30 MIN: CPT | Performed by: SPECIALIST

## 2022-09-30 PROCEDURE — G8752 SYS BP LESS 140: HCPCS | Performed by: SPECIALIST

## 2022-09-30 NOTE — PROGRESS NOTES
Martita Parker is a 78 y.o. male    Visit Vitals  /86 (BP 1 Location: Left upper arm, BP Patient Position: Sitting, BP Cuff Size: Adult)   Pulse 79   Ht 6' 2\" (1.88 m)   Wt 207 lb (93.9 kg)   SpO2 99%   BMI 26.58 kg/m²       Chief Complaint   Patient presents with    Hypertension    Chest Pain       Chest pain NO  SOB NO  Dizziness NO  Swelling NO  Recent hospital visit NO  Refills NO  COVID VACCINE STATUS YES  HAD COVID?  NO

## 2022-09-30 NOTE — LETTER
9/30/2022    Patient: Keegan Coleman   YOB: 1955   Date of Visit: 9/30/2022     Luz Medina [address] [place] [address]  Via Fax: [phone]     Gertrudis Nesbitt RN  Via In Basket    Dear ART Medina RN,      Thank you for referring Mr. Keegan Coleman to [facility] for evaluation. My notes for this consultation are attached. If you have questions, please do not hesitate to call me. I look forward to following your patient along with you.       Sincerely,    Acacia Piña MD

## 2022-09-30 NOTE — PATIENT INSTRUCTIONS

## 2022-09-30 NOTE — PROGRESS NOTES
CARDIOLOGY OFFICE NOTE    Harsha Singh MD, [address], [place], [address] [facility]  Phone [phone]; Fax [phone]  Mobile [phone]   Voice Mail [phone]    LAST OFFICE VISIT : Visit date not found  ART Dodson       ATTENTION:   This medical record was transcribed using an electronic medical records/speech recognition system. Although proofread, it may and can contain electronic, spelling and other errors. Corrections may be executed at a later time. Please feel free to contact us for any clarifications as needed. ICD-10-CM ICD-9-CM   1. Chest discomfort  R07.89 786.59   2. Essential hypertension  I10 401.9   3. Hypercholesteremia  E78.00 272.0            Earle Chiu is a 78 y.o. male with  referred for      . Cardiac risk factors: family history, dyslipidemia, diabetes mellitus, male gender, hypertension  I have personally obtained the history from the patient. HISTORY OF PRESENTING ILLNESS   From previous note:   He is a 78-year-old gentleman with past medical history of diabetes, hypertension, and dyslipidemia. He states doing well. His shortness of breath is improved. He does have a calcium score of 0. No chest pain. He is feeling fatigued a lot. All cardiac testing was negative. He is trying to use his CPAP on a more regular basis.          ACTIVE PROBLEM LIST     Patient Active Problem List    Diagnosis Date Noted    Pain in testicle 10/14/2020    Impotence 10/14/2020    Type II or unspecified type diabetes mellitus with renal manifestations, not stated as uncontrolled(250.40) ([facility] [address] 07/27/2015    HTN (hypertension) 03/24/2015    Hypercholesteremia 03/24/2015    Dysthymia 03/24/2015    ADD (attention deficit disorder) 01/24/2013    Insulin dependent diabetes mellitus 01/24/2013           PAST MEDICAL HISTORY     Past Medical History:   Diagnosis Date    Diabetes ([facility] [address]     Hypercholesterolemia     Hypertension PAST SURGICAL HISTORY     Past Surgical History:   Procedure Laterality Date    HX TONSILLECTOMY            ALLERGIES     No Known Allergies       FAMILY HISTORY     Family History   Problem Relation Age of Onset    Cancer Mother         Breast    Cancer Father         Colon    Kidney Disease Brother     Diabetes Paternal Aunt     Sickle Cell Anemia Maternal Grandmother     negative for cardiac disease       SOCIAL HISTORY     Social History     Socioeconomic History    Marital status:    Tobacco Use    Smoking status: Never    Smokeless tobacco: Never   Substance and Sexual Activity    Alcohol use: No     Alcohol/week: 0.0 standard drinks    Drug use: Never    Sexual activity: Yes     Partners: Female         MEDICATIONS     Current Outpatient Medications   Medication Sig    atorvastatin (LIPITOR) 20 mg tablet Take  by mouth daily. empagliflozin (JARDIANCE) 25 mg tablet Take  by mouth daily. semaglutide (Ozempic) 1 mg/dose (2 mg/1.5 mL) sub-q pen 2 mg by SubCUTAneous route every seven (7) days. metoprolol succinate (TOPROL-XL) 50 mg XL tablet TAKE 1 TABLET BY MOUTH EVERY DAY    flash glucose sensor (FREESTYLE FLACA 2 SENSOR) as directed    aspirin delayed-release 81 mg tablet Take  by mouth daily. ergocalciferol (ERGOCALCIFEROL) 1,250 mcg (50,000 unit) capsule TAKE 1 CAPSULE BY MOUTH 1 TIME A WEEK    valsartan (DIOVAN) 80 mg tablet TAKE 2 TABLETS BY MOUTH DAILY FOR HIGH BLOOD PRESSURE    insulin glargine 300 unit/mL (1.5 mL) inpn 32 Units by SubCUTAneous route nightly. ascorbic acid, vitamin C, (VITAMIN C) 1,000 mg tablet Take 500 mg by mouth daily. (Patient not taking: Reported on 9/30/2022)     No current facility-administered medications for this visit. I have reviewed the nurses notes, vitals, problem list, allergy list, medical history, family, social history and medications.        REVIEW OF SYMPTOMS   Pertinent positive per HPI  General: Pt denies excessive weight gain or loss. Pt is able to conduct ADL's  HEENT: Denies blurred vision, headaches, hearing loss, epistaxis and difficulty swallowing. Respiratory: Denies cough, congestion, shortness of breath, ROSSI, wheezing or stridor. Cardiovascular: Denies precordial pain, palpitations, edema or PND  Gastrointestinal: Denies poor appetite, indigestion, abdominal pain or blood in stool  Genitourinary: Denies hematuria, dysuria, increased urinary frequency  Musculoskeletal: Denies joint pain or swelling from muscles or joints  Neurologic: Denies tremor, paresthesias, headache, or sensory motor disturbance  Psychiatric: Denies confusion, insomnia, depression  Integumentray: Denies rash, itching or ulcers. Hematologic: Denies easy bruising, bleeding     PHYSICAL EXAMINATION      Vitals:    22 0946   BP: 134/86   Pulse: 79   SpO2: 99%   Weight: 207 lb (93.9 kg)   Height: 6' 2\" (1.88 m)     General: Well developed, in no acute distress. HEENT: No jaundice, oral mucosa moist, no oral ulcers  Neck: Supple, no stiffness, no lymphadenopathy, supple  Heart:  Normal S1/S2 negative S3 or S4. Regular, no murmur, gallop or rub, no jugular venous distention  Respiratory: Clear bilaterally x 4, no wheezing or rales  Extremities:  No edema, normal cap refill, no cyanosis. Musculoskeletal: No clubbing, no deformities  Neuro: A&Ox3, speech clear, gait stable, cooperative, no focal neurologic deficits  Skin: Skin color is normal. No rashes or lesions. Non diaphoretic, moist.          EK/3/2022: Sinus rhythm normal intervals     DIAGNOSTIC DATA     1. LE Venous Duplex   21-No evidence of thrombosis in the right lower extremity     2. Echo   21- EF 60%     3. Stress Test   21-Cardiolite-Good functional capacity completing 9 mins of Chava Protocol. ECG during stress with frequent PVCs along with 1mm upsloping depression within inferior leads, resulting in Sweeney Treadmill score of 4. Normal myocardial perfusion imaging.     4. Lipids  6/28/21- , HDL 43, LDL 57, TG 62  11/22/21- , HDL 50, LDL 37,   7/8/22- , HDL 51, LDL 52,     5. Coronary CT  8/2/21-no significant blockages, calcium score is 0         LABORATORY DATA            Lab Results   Component Value Date/Time    WBC 14.1 (H) 10/30/2017 05:15 AM    HGB 17.0 10/30/2017 05:15 AM    HCT 50.7 (H) 10/30/2017 05:15 AM    PLATELET 637 45/67/8803 05:15 AM    MCV 86.8 10/30/2017 05:15 AM      Lab Results   Component Value Date/Time    Sodium 138 10/30/2017 05:15 AM    Potassium 3.5 10/30/2017 05:15 AM    Chloride 102 10/30/2017 05:15 AM    CO2 31 10/30/2017 05:15 AM    Anion gap 5 10/30/2017 05:15 AM    Glucose 52 (L) 10/30/2017 05:15 AM    BUN 15 10/30/2017 05:15 AM    Creatinine 1.73 (H) 10/30/2017 05:15 AM    BUN/Creatinine ratio 9 (L) 10/30/2017 05:15 AM    GFR est AA 49 (L) 10/30/2017 05:15 AM    GFR est non-AA 40 (L) 10/30/2017 05:15 AM    Calcium 8.8 10/30/2017 05:15 AM    Bilirubin, total 0.7 06/28/2021 10:13 AM    Alk. phosphatase 72 06/28/2021 10:13 AM    Protein, total 7.4 06/28/2021 10:13 AM    Albumin 4.4 06/28/2021 10:13 AM    Globulin 3.9 10/30/2017 05:15 AM    A-G Ratio 1.0 (L) 10/30/2017 05:15 AM    ALT (SGPT) 40 06/28/2021 10:13 AM           ASSESSMENT/RECOMMENDATIONS:.      1.  shortness of breath   -has improved  -May be secondary to his significant weight loss.  -His last EF in 2021 was 60%  -Stress test in 2021 showed normal perfusion  -Calcium score 0    2. Hypertension  -Blood pressure is at goal today 128/68  -Reduce sodium intake    3. Dyslipidemia LDL  -has been at goal      4. Diabetes on insulin  -last Hgb A1c 7 roughly    5. ZEYAD  -Compliant with CPAP  Orders Placed This Encounter    AMB POC EKG ROUTINE W/ 12 LEADS, INTER & REP     Order Specific Question:   Reason for Exam:     Answer:   HTM       We discussed the expected course, resolution and complications of the diagnosis(es) in detail.   Medication risks, benefits, costs, interactions, and alternatives were discussed as indicated. I advised him to contact the office if his condition worsens, changes or fails to improve as anticipated. He expressed understanding with the diagnosis(es) and plan          Follow-up and Dispositions    Return in about 6 months (around 3/30/2023). I have discussed the diagnosis with  Renee Meredith and the intended plan as seen in the above orders. Questions were answered concerning future plans. I have discussed medication side effects and warnings with the patient as well. Thank you,  ART Mistry for involving me in the care of  Renee Meredith. Please do not hesitate to contact me for further questions/concerns. Harsha Lawson MD, [address] [place] [address] Rd[address]      [facility], [address] [place] [address]      [phone] / [phone] Fax

## 2023-05-20 RX ORDER — VALSARTAN 80 MG/1
TABLET ORAL
COMMUNITY
Start: 2021-04-21

## 2023-05-20 RX ORDER — ATORVASTATIN CALCIUM 20 MG/1
TABLET, FILM COATED ORAL DAILY
COMMUNITY

## 2023-05-20 RX ORDER — ASPIRIN 81 MG/1
TABLET ORAL DAILY
COMMUNITY

## 2023-05-20 RX ORDER — ERGOCALCIFEROL 1.25 MG/1
CAPSULE ORAL
COMMUNITY
Start: 2021-03-20

## 2023-05-20 RX ORDER — METOPROLOL SUCCINATE 50 MG/1
1 TABLET, EXTENDED RELEASE ORAL DAILY
COMMUNITY
Start: 2021-07-03

## 2023-05-20 RX ORDER — SEMAGLUTIDE 1.34 MG/ML
2 INJECTION, SOLUTION SUBCUTANEOUS
COMMUNITY

## 2024-03-18 ENCOUNTER — HOSPITAL ENCOUNTER (EMERGENCY)
Facility: HOSPITAL | Age: 69
Discharge: HOME | End: 2024-03-18
Attending: EMERGENCY MEDICINE
Payer: MEDICARE

## 2024-03-18 VITALS
TEMPERATURE: 98.6 F | HEIGHT: 74 IN | RESPIRATION RATE: 16 BRPM | OXYGEN SATURATION: 97 % | SYSTOLIC BLOOD PRESSURE: 141 MMHG | HEART RATE: 83 BPM | WEIGHT: 206 LBS | DIASTOLIC BLOOD PRESSURE: 82 MMHG | BODY MASS INDEX: 26.44 KG/M2

## 2024-03-18 DIAGNOSIS — S61.211A LACERATION OF LEFT INDEX FINGER WITHOUT FOREIGN BODY WITHOUT DAMAGE TO NAIL, INITIAL ENCOUNTER: Primary | ICD-10-CM

## 2024-03-18 PROCEDURE — 6360000002 HC RX W HCPCS: Performed by: EMERGENCY MEDICINE

## 2024-03-18 PROCEDURE — 90714 TD VACC NO PRESV 7 YRS+ IM: CPT | Performed by: EMERGENCY MEDICINE

## 2024-03-18 PROCEDURE — 99284 EMERGENCY DEPT VISIT MOD MDM: CPT

## 2024-03-18 PROCEDURE — 90471 IMMUNIZATION ADMIN: CPT | Performed by: EMERGENCY MEDICINE

## 2024-03-18 RX ORDER — CEPHALEXIN 500 MG/1
500 CAPSULE ORAL 4 TIMES DAILY
Qty: 20 CAPSULE | Refills: 0 | Status: SHIPPED | OUTPATIENT
Start: 2024-03-18 | End: 2024-03-23

## 2024-03-18 RX ADMIN — CLOSTRIDIUM TETANI TOXOID ANTIGEN (FORMALDEHYDE INACTIVATED) AND CORYNEBACTERIUM DIPHTHERIAE TOXOID ANTIGEN (FORMALDEHYDE INACTIVATED) 0.5 ML: 5; 2 INJECTION, SUSPENSION INTRAMUSCULAR at 19:09

## 2024-03-18 ASSESSMENT — LIFESTYLE VARIABLES
HOW OFTEN DO YOU HAVE A DRINK CONTAINING ALCOHOL: NEVER
HOW MANY STANDARD DRINKS CONTAINING ALCOHOL DO YOU HAVE ON A TYPICAL DAY: PATIENT DOES NOT DRINK

## 2024-03-18 ASSESSMENT — PAIN - FUNCTIONAL ASSESSMENT: PAIN_FUNCTIONAL_ASSESSMENT: NONE - DENIES PAIN

## 2024-03-18 NOTE — ED PROVIDER NOTES
[facility]  EMERGENCY DEPARTMENT ENCOUNTER      Pt Name: Hayden Garcia  MRN: [id number]  Birthdate 1955  Date of evaluation: 3/18/2024  Provider: MARCELLO Perla    CHIEF COMPLAINT       Chief Complaint   Patient presents with    Laceration         HISTORY OF PRESENT ILLNESS   (Location/Symptom, Timing/Onset, Context/Setting, Quality, Duration, Modifying Factors, Severity)  Note limiting factors.   Hayden Garcia is a 68 y.o. male with past medical history as listed below who presents to the emergency department for evaluation of laceration to his left index finger.  He states that 2 nights ago he was cooking dinner and accidentally cut his finger with a clean knife.  He states he irrigated and cleaned the wound after the injury but wanted to be evaluated today he states he does not have any concern for any infection but since he is a diabetic he wanted to be checked.  He cannot remember when his last tetanus shot was.      Nursing Notes were reviewed.    REVIEW OF SYSTEMS    (2-9 systems for level 4, 10 or more for level 5)     Review of Systems   Skin:  Positive for wound.       Except as noted above the remainder of the review of systems was reviewed and negative.       PAST MEDICAL HISTORY     Past Medical History:   Diagnosis Date    Diabetes (HCC)     Hypercholesterolemia     Hypertension        SURGICAL HISTORY       Past Surgical History:   Procedure Laterality Date    TONSILLECTOMY         CURRENT MEDICATIONS       Previous Medications    ASPIRIN 81 MG EC TABLET    Take by mouth daily    ATORVASTATIN (LIPITOR) 20 MG TABLET    Take by mouth daily    EMPAGLIFLOZIN (JARDIANCE) 25 MG TABLET    Take by mouth daily    ERGOCALCIFEROL (ERGOCALCIFEROL) 1.25 MG (40361 UT) CAPSULE    TAKE 1 CAPSULE BY MOUTH 1 TIME A WEEK    INSULIN GLARGINE, 1 UNIT DIAL, (TOUJEO) 300 UNIT/ML CONCENTRATED INJECTION PEN    Inject 32 Units into the skin    METOPROLOL SUCCINATE (TOPROL XL) 50 MG EXTENDED RELEASE

## 2024-03-18 NOTE — ED TRIAGE NOTES
Patient arrives to ED with c/o laceration to second left digit. Patient states that he cut his finger with a knife while cooking two days ago. VSS. Not actively bleeding. Patient states that he would like the would checked since he is a diabetic.

## 2024-05-02 NOTE — PROGRESS NOTES
CARDIOLOGY OFFICE NOTE    Jacques Chu MD, [facility]    [address], [place] [address]  Phone [phone]; Fax [phone]  Mobile [phone]   Voice Mail [phone]    Primary care: Esther Matta, [facility]       ATTENTION:   This medical record was transcribed using an electronic medical records/speech recognition system.  Although proofread, it may and can contain electronic, spelling and other errors.  Corrections may be executed at a later time.  Please feel free to contact us for any clarifications as needed.             Hayden Garcia is a 68 y.o. male with  referred for HTN and dyslipidemia             .        Cardiac risk factors: family history, dyslipidemia, diabetes mellitus, male gender, hypertension   I have personally obtained the history from the patient.         HISTORY OF PRESENTING ILLNESS    Ms./Mr. Hayden Garcia  68 y.o. is very nice gentleman with history of diabetes hypertension dyslipidemia.  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  He has not been exercising and instructed him the importance of walking 30 minutes 5 days a week.  Reviewed his cholesterol profile and his blood pressure today all of which is good.  He denies any chest pain or shortness of breath.         ACTIVE PROBLEM LIST     Patient Active Problem List    Diagnosis Date Noted    Impotence 10/14/2020    Pain in testicle 10/14/2020    Type II or unspecified type diabetes mellitus with renal manifestations, not stated as uncontrolled(250.40) 07/27/2015    Dysthymia 03/24/2015    Hypercholesteremia 03/24/2015    HTN (hypertension) 03/24/2015    ADD (attention deficit disorder) 01/24/2013           PAST MEDICAL HISTORY     Past Medical History:   Diagnosis Date    Diabetes (HCC)     Hypercholesterolemia     Hypertension            PAST SURGICAL HISTORY     Past Surgical History:   Procedure Laterality Date

## 2024-05-02 NOTE — PATIENT INSTRUCTIONS

## 2024-05-03 ENCOUNTER — OFFICE VISIT (OUTPATIENT)
Age: 69
End: 2024-05-03
Payer: MEDICARE

## 2024-05-03 VITALS
SYSTOLIC BLOOD PRESSURE: 124 MMHG | DIASTOLIC BLOOD PRESSURE: 74 MMHG | HEIGHT: 74 IN | BODY MASS INDEX: 27.11 KG/M2 | WEIGHT: 211.2 LBS | HEART RATE: 76 BPM | OXYGEN SATURATION: 97 %

## 2024-05-03 DIAGNOSIS — I10 ESSENTIAL (PRIMARY) HYPERTENSION: Primary | ICD-10-CM

## 2024-05-03 DIAGNOSIS — E78.00 PURE HYPERCHOLESTEROLEMIA, UNSPECIFIED: ICD-10-CM

## 2024-05-03 PROCEDURE — 3074F SYST BP LT 130 MM HG: CPT | Performed by: SPECIALIST

## 2024-05-03 PROCEDURE — 93005 ELECTROCARDIOGRAM TRACING: CPT | Performed by: SPECIALIST

## 2024-05-03 PROCEDURE — 3017F COLORECTAL CA SCREEN DOC REV: CPT | Performed by: SPECIALIST

## 2024-05-03 PROCEDURE — G8419 CALC BMI OUT NRM PARAM NOF/U: HCPCS | Performed by: SPECIALIST

## 2024-05-03 PROCEDURE — 93010 ELECTROCARDIOGRAM REPORT: CPT | Performed by: SPECIALIST

## 2024-05-03 PROCEDURE — 99214 OFFICE O/P EST MOD 30 MIN: CPT | Performed by: SPECIALIST

## 2024-05-03 PROCEDURE — 1123F ACP DISCUSS/DSCN MKR DOCD: CPT | Performed by: SPECIALIST

## 2024-05-03 PROCEDURE — G8427 DOCREV CUR MEDS BY ELIG CLIN: HCPCS | Performed by: SPECIALIST

## 2024-05-03 PROCEDURE — 3078F DIAST BP <80 MM HG: CPT | Performed by: SPECIALIST

## 2024-05-03 PROCEDURE — 1036F TOBACCO NON-USER: CPT | Performed by: SPECIALIST

## 2024-05-03 RX ORDER — METOPROLOL SUCCINATE 50 MG/1
50 TABLET, EXTENDED RELEASE ORAL DAILY
Qty: 30 TABLET | Refills: 5 | Status: SHIPPED | OUTPATIENT
Start: 2024-05-03

## 2024-05-03 RX ORDER — LIDOCAINE 50 MG/G
1 PATCH TOPICAL EVERY 24 HOURS
COMMUNITY
Start: 2024-04-15

## 2024-05-03 RX ORDER — VALSARTAN 80 MG/1
TABLET ORAL
Qty: 60 TABLET | Refills: 5 | Status: SHIPPED | OUTPATIENT
Start: 2024-05-03

## 2024-05-03 NOTE — PROGRESS NOTES
Chief Complaint   Patient presents with    Hypertension    Chest Pain    Hyperlipidemia     Vitals:    24 1119   BP: 124/74   Site: Left Upper Arm   Position: Sitting   Pulse: 76   SpO2: 97%   Weight: 95.8 kg (211 lb 3.2 oz)   Height: 1.88 m (6' 2\")     Chest pain: DENIED     Recent hospital stays: DENIED     Refills: DENIED     NAME Hayden DYSON    1955      MRN    [id number]      LAST OFFICE APPOINTMENT: 2023     DIAGNOSIS    ICD-10-CM    1. Essential (primary) hypertension  I10 EKG 12 Lead      2. Pure hypercholesterolemia, unspecified  E78.00           HOME MEDICATION  Current Outpatient Medications   Medication Sig    lidocaine (LIDODERM) 5 % Place 1 patch onto the skin every 24 hours    aspirin 81 MG EC tablet Take by mouth daily    atorvastatin (LIPITOR) 20 MG tablet Take by mouth daily    empagliflozin (JARDIANCE) 25 MG tablet Take by mouth daily    insulin glargine, 1 unit dial, (TOUJEO) 300 UNIT/ML concentrated injection pen Inject 32 Units into the skin    metoprolol succinate (TOPROL XL) 50 MG extended release tablet Take 1 tablet by mouth daily    Semaglutide, 1 MG/DOSE, (OZEMPIC, 1 MG/DOSE,) 2 MG/1.5ML SOPN Inject 2 mg into the skin every 7 days    valsartan (DIOVAN) 80 MG tablet TAKE 2 TABLETS BY MOUTH DAILY FOR HIGH BLOOD PRESSURE    ergocalciferol (ERGOCALCIFEROL) 1.25 MG (84682 UT) capsule TAKE 1 CAPSULE BY MOUTH 1 TIME A WEEK (Patient not taking: Reported on 5/3/2024)     No current facility-administered medications for this visit.       VITAL SIGNS  Wt Readings from Last 3 Encounters:   24 95.8 kg (211 lb 3.2 oz)   24 93.4 kg (206 lb)   23 93.9 kg (207 lb)     BP Readings from Last 3 Encounters:   24 124/74   24 (!) 141/82   23 138/80     Pulse Readings from Last 3 Encounters:   24 76   24 83   23 78         SPECIALTY COMMENTS  1. LE Venous Duplex    21-No evidence of thrombosis in the right lower extremity       2.

## 2024-07-11 ENCOUNTER — TELEPHONE (OUTPATIENT)
Age: 69
End: 2024-07-11

## 2025-03-06 ENCOUNTER — OFFICE VISIT (OUTPATIENT)
Age: 70
End: 2025-03-06
Payer: MEDICARE

## 2025-03-06 VITALS
BODY MASS INDEX: 26.69 KG/M2 | DIASTOLIC BLOOD PRESSURE: 70 MMHG | HEART RATE: 70 BPM | HEIGHT: 74 IN | SYSTOLIC BLOOD PRESSURE: 120 MMHG | OXYGEN SATURATION: 95 % | RESPIRATION RATE: 17 BRPM | WEIGHT: 208 LBS

## 2025-03-06 DIAGNOSIS — I10 PRIMARY HYPERTENSION: Primary | ICD-10-CM

## 2025-03-06 DIAGNOSIS — E78.5 DYSLIPIDEMIA: ICD-10-CM

## 2025-03-06 PROCEDURE — 1159F MED LIST DOCD IN RCRD: CPT | Performed by: SPECIALIST

## 2025-03-06 PROCEDURE — 3074F SYST BP LT 130 MM HG: CPT | Performed by: SPECIALIST

## 2025-03-06 PROCEDURE — 3017F COLORECTAL CA SCREEN DOC REV: CPT | Performed by: SPECIALIST

## 2025-03-06 PROCEDURE — G8419 CALC BMI OUT NRM PARAM NOF/U: HCPCS | Performed by: SPECIALIST

## 2025-03-06 PROCEDURE — 99214 OFFICE O/P EST MOD 30 MIN: CPT | Performed by: SPECIALIST

## 2025-03-06 PROCEDURE — 3078F DIAST BP <80 MM HG: CPT | Performed by: SPECIALIST

## 2025-03-06 PROCEDURE — 1126F AMNT PAIN NOTED NONE PRSNT: CPT | Performed by: SPECIALIST

## 2025-03-06 PROCEDURE — 1036F TOBACCO NON-USER: CPT | Performed by: SPECIALIST

## 2025-03-06 PROCEDURE — 1123F ACP DISCUSS/DSCN MKR DOCD: CPT | Performed by: SPECIALIST

## 2025-03-06 PROCEDURE — G8427 DOCREV CUR MEDS BY ELIG CLIN: HCPCS | Performed by: SPECIALIST

## 2025-03-06 RX ORDER — SEMAGLUTIDE 1.34 MG/ML
1 INJECTION, SOLUTION SUBCUTANEOUS
COMMUNITY
Start: 2025-03-05

## 2025-03-06 NOTE — PROGRESS NOTES
had concerns including Hypertension.    Vitals:    03/06/25 0935   BP: 120/70   Site: Left Upper Arm   Position: Sitting   Pulse: 70   Resp: 17   SpO2: 95%   Weight: 94.3 kg (208 lb)   Height: 1.88 m (6' 2\")        Chest pain No    Refills No        1. Have you been to the ER, urgent care clinic since your last visit? No       Hospitalized since your last visit? No       Where?        Date?  
inferior leads, resulting in Frederick Treadmill score of 4.  Normal myocardial perfusion imaging.      4. Lipids   1/18/23- , HDL 50, LDL 71, TG 76      5. Coronary CT   8/2/21-no significant blockages, calcium score is 0       ASSESSMENT AND PLAN:     Assessment and Plan:    CV Risk assessment   - LVEF on Echo 2021 was 60%  - Stress test in 2021 showed normal perfusion  - Calcium score 0 in 2021   - he feels well without cardiac complaints   - Encouraged him to continue to work on risk factor modification  - ASA is not needed      Hypertension  - BP OK   - Cont Toprol XL and valsartan      Dyslipidemia   - cont statin   - prior lipids OK     DM  - per PCP      CKD  - sees Nephrology     See me in 1 year     Retired ([place] [employer]) - part time [employer].  with 3 kids.        Madison Welch MD, FACC    [facility] [place]  [facility]  [address], [address]  [address]  [place], [place]  [address]  [place] [address]  Ph: [phone]   Ph [phone]

## 2025-03-06 NOTE — PATIENT INSTRUCTIONS
Patient Education        Learning About the Mediterranean Diet  What is the Mediterranean diet?     The Mediterranean diet is a style of eating rather than a diet plan. It features foods eaten in [place], [place], southern [place], and other countries along the [place] Sea. It emphasizes eating foods like fish, fruits, vegetables, beans, high-fiber breads and whole grains, nuts, and olive oil. This style of eating includes limited red meat, cheese, and sweets.  Why choose the Mediterranean diet?  A Mediterranean-style diet may improve heart health. It contains more fat than other heart-healthy diets. But the fats are mainly from nuts, unsaturated oils (such as fish oils and olive oil), and certain nut or seed oils (such as canola, soybean, or flaxseed oil). These fats may help protect the heart and blood vessels.  How can you get started on the Mediterranean diet?  Here are some things you can do to switch to a more Mediterranean way of eating.  What to eat  Eat a variety of fruits and vegetables each day, such as grapes, blueberries, tomatoes, broccoli, peppers, figs, olives, spinach, eggplant, beans, lentils, and chickpeas.  Eat a variety of whole-grain foods each day, such as oats, brown rice, and whole wheat bread, pasta, and couscous.  Eat fish at least 2 times a week. Try tuna, salmon, mackerel, lake trout, herring, or sardines.  Eat moderate amounts of low-fat dairy products, such as milk, cheese, or yogurt.  Eat moderate amounts of poultry and eggs.  Choose healthy (unsaturated) fats, such as nuts, olive oil, and certain nut or seed oils like canola, soybean, and flaxseed.  Limit unhealthy (saturated) fats, such as butter, palm oil, and coconut oil. And limit fats found in animal products, such as meat and dairy products made with whole milk. Try to eat red meat only a few times a month in very small amounts.  Limit sweets and desserts to only a few times a week. This includes sugar-sweetened

## 2025-03-27 ENCOUNTER — APPOINTMENT (OUTPATIENT)
Facility: HOSPITAL | Age: 70
End: 2025-03-27
Payer: MEDICARE

## 2025-03-27 ENCOUNTER — HOSPITAL ENCOUNTER (EMERGENCY)
Facility: HOSPITAL | Age: 70
Discharge: HOME | End: 2025-03-27
Attending: EMERGENCY MEDICINE
Payer: MEDICARE

## 2025-03-27 VITALS
HEART RATE: 68 BPM | SYSTOLIC BLOOD PRESSURE: 156 MMHG | OXYGEN SATURATION: 97 % | RESPIRATION RATE: 20 BRPM | TEMPERATURE: 97.5 F | HEIGHT: 74 IN | WEIGHT: 202 LBS | BODY MASS INDEX: 25.93 KG/M2 | DIASTOLIC BLOOD PRESSURE: 84 MMHG

## 2025-03-27 DIAGNOSIS — N50.82 SCROTAL PAIN: Primary | ICD-10-CM

## 2025-03-27 DIAGNOSIS — N43.3 HYDROCELE, UNSPECIFIED HYDROCELE TYPE: ICD-10-CM

## 2025-03-27 DIAGNOSIS — I86.1 VARICOCELE: ICD-10-CM

## 2025-03-27 LAB
ALBUMIN SERPL-MCNC: 4 G/DL (ref 3.5–5.2)
ALBUMIN/GLOB SERPL: 1.3 (ref 1.1–2.2)
ALP SERPL-CCNC: 73 U/L (ref 40–129)
ALT SERPL-CCNC: 24 U/L (ref 10–50)
ANION GAP SERPL CALC-SCNC: 9 MMOL/L (ref 2–12)
APPEARANCE UR: CLEAR
AST SERPL-CCNC: 36 U/L (ref 10–50)
BACTERIA URNS QL MICRO: NEGATIVE /HPF
BASOPHILS # BLD: 0.02 K/UL (ref 0–0.1)
BASOPHILS NFR BLD: 0.3 % (ref 0–1)
BILIRUB SERPL-MCNC: 0.7 MG/DL (ref 0.2–1)
BILIRUB UR QL: NEGATIVE
BUN SERPL-MCNC: 14 MG/DL (ref 8–23)
BUN/CREAT SERPL: 9 (ref 12–20)
CALCIUM SERPL-MCNC: 9 MG/DL (ref 8.8–10.2)
CHLORIDE SERPL-SCNC: 103 MMOL/L (ref 98–107)
CO2 SERPL-SCNC: 27 MMOL/L (ref 22–29)
COLOR UR: ABNORMAL
CREAT SERPL-MCNC: 1.63 MG/DL (ref 0.7–1.2)
DIFFERENTIAL METHOD BLD: ABNORMAL
EOSINOPHIL # BLD: 0.12 K/UL (ref 0–0.4)
EOSINOPHIL NFR BLD: 1.8 % (ref 0–7)
EPITH CASTS URNS QL MICRO: ABNORMAL /LPF
ERYTHROCYTE [DISTWIDTH] IN BLOOD BY AUTOMATED COUNT: 13.6 % (ref 11.5–14.5)
GLOBULIN SER CALC-MCNC: 3.2 G/DL (ref 2–4)
GLUCOSE SERPL-MCNC: 135 MG/DL (ref 65–100)
GLUCOSE UR STRIP.AUTO-MCNC: >1000 MG/DL
HCT VFR BLD AUTO: 50.5 % (ref 36.6–50.3)
HGB BLD-MCNC: 17 G/DL (ref 12.1–17)
HGB UR QL STRIP: NEGATIVE
IMM GRANULOCYTES # BLD AUTO: 0.01 K/UL (ref 0–0.04)
IMM GRANULOCYTES NFR BLD AUTO: 0.2 % (ref 0–0.5)
INR PPP: 1 (ref 0.9–1.1)
KETONES UR QL STRIP.AUTO: NEGATIVE MG/DL
LEUKOCYTE ESTERASE UR QL STRIP.AUTO: NEGATIVE
LIPASE SERPL-CCNC: 45 U/L (ref 13–60)
LYMPHOCYTES # BLD: 1.59 K/UL (ref 0.8–3.5)
LYMPHOCYTES NFR BLD: 24.4 % (ref 12–49)
MCH RBC QN AUTO: 29.4 PG (ref 26–34)
MCHC RBC AUTO-ENTMCNC: 33.7 G/DL (ref 30–36.5)
MCV RBC AUTO: 87.4 FL (ref 80–99)
MONOCYTES # BLD: 0.74 K/UL (ref 0–1)
MONOCYTES NFR BLD: 11.4 % (ref 5–13)
NEUTS SEG # BLD: 4.03 K/UL (ref 1.8–8)
NEUTS SEG NFR BLD: 61.9 % (ref 32–75)
NITRITE UR QL STRIP.AUTO: NEGATIVE
NRBC # BLD: 0 K/UL (ref 0–0.01)
NRBC BLD-RTO: 0 PER 100 WBC
PH UR STRIP: 6.5 (ref 5–8)
PLATELET # BLD AUTO: 173 K/UL (ref 150–400)
PMV BLD AUTO: 11.6 FL (ref 8.9–12.9)
POTASSIUM SERPL-SCNC: 4.2 MMOL/L (ref 3.5–5.1)
PROT SERPL-MCNC: 7.2 G/DL (ref 6.4–8.3)
PROT UR STRIP-MCNC: NEGATIVE MG/DL
PROTHROMBIN TIME: 13.1 SEC (ref 11.9–14.6)
RBC # BLD AUTO: 5.78 M/UL (ref 4.1–5.7)
RBC #/AREA URNS HPF: ABNORMAL /HPF (ref 0–5)
SODIUM SERPL-SCNC: 139 MMOL/L (ref 136–145)
SP GR UR REFRACTOMETRY: 1.01 (ref 1–1.03)
URINE CULTURE IF INDICATED: ABNORMAL
UROBILINOGEN UR QL STRIP.AUTO: 0.2 EU/DL (ref 0.2–1)
WBC # BLD AUTO: 6.5 K/UL (ref 4.1–11.1)
WBC URNS QL MICRO: ABNORMAL /HPF (ref 0–4)

## 2025-03-27 PROCEDURE — 85610 PROTHROMBIN TIME: CPT

## 2025-03-27 PROCEDURE — 99284 EMERGENCY DEPT VISIT MOD MDM: CPT

## 2025-03-27 PROCEDURE — 80053 COMPREHEN METABOLIC PANEL: CPT

## 2025-03-27 PROCEDURE — 83690 ASSAY OF LIPASE: CPT

## 2025-03-27 PROCEDURE — 36415 COLL VENOUS BLD VENIPUNCTURE: CPT

## 2025-03-27 PROCEDURE — 76870 US EXAM SCROTUM: CPT

## 2025-03-27 PROCEDURE — 85025 COMPLETE CBC W/AUTO DIFF WBC: CPT

## 2025-03-27 PROCEDURE — 81001 URINALYSIS AUTO W/SCOPE: CPT

## 2025-03-27 ASSESSMENT — PAIN - FUNCTIONAL ASSESSMENT: PAIN_FUNCTIONAL_ASSESSMENT: 0-10

## 2025-03-27 ASSESSMENT — PAIN DESCRIPTION - LOCATION: LOCATION: GROIN

## 2025-03-27 ASSESSMENT — PAIN SCALES - GENERAL: PAINLEVEL_OUTOF10: 5

## 2025-03-27 ASSESSMENT — PAIN DESCRIPTION - DESCRIPTORS: DESCRIPTORS: ACHING

## 2025-03-27 ASSESSMENT — PAIN DESCRIPTION - ORIENTATION: ORIENTATION: LEFT

## 2025-03-27 NOTE — ED PROVIDER NOTES
[facility] DEPARTMENT  EMERGENCY DEPARTMENT ENCOUNTER      Pt Name: Hayden Garcia  MRN: [id number]  Birthdate 1955  Date of evaluation: 3/27/2025  Provider: Sandro Rm MD      HISTORY OF PRESENT ILLNESS      69-year-old male with history of diabetes and hypertension presents to the emergency department chief complaint of testicular pain.  This has been an intermittent problem for at least a year.  Last time he was evaluated by urologist he was placed on antibiotics with resolution.  He does not know what the diagnosis was.  Also reports recent colonoscopy without abdominal pain or rectal bleeding.    The history is provided by the patient and medical records.           Nursing Notes were reviewed.    REVIEW OF SYSTEMS         Review of Systems        PAST MEDICAL HISTORY     Past Medical History:   Diagnosis Date    Diabetes (HCC)     Hypercholesterolemia     Hypertension          SURGICAL HISTORY       Past Surgical History:   Procedure Laterality Date    TONSILLECTOMY           CURRENT MEDICATIONS       Previous Medications    ASPIRIN 81 MG EC TABLET    Take by mouth daily    ATORVASTATIN (LIPITOR) 20 MG TABLET    Take by mouth daily    EMPAGLIFLOZIN (JARDIANCE) 25 MG TABLET    Take by mouth daily    ERGOCALCIFEROL (ERGOCALCIFEROL) 1.25 MG (21286 UT) CAPSULE    TAKE 1 CAPSULE BY MOUTH 1 TIME A WEEK    INSULIN GLARGINE, 1 UNIT DIAL, (TOUJEO) 300 UNIT/ML CONCENTRATED INJECTION PEN    Inject 32 Units into the skin    LIDOCAINE (LIDODERM) 5 %    Place 1 patch onto the skin every 24 hours    METOPROLOL SUCCINATE (TOPROL XL) 50 MG EXTENDED RELEASE TABLET    Take 1 tablet by mouth daily    OZEMPIC, 1 MG/DOSE, 4 MG/3ML SOPN SC INJECTION    Inject 1 mg into the skin every 7 days    VALSARTAN (DIOVAN) 80 MG TABLET    TAKE 2 TABLETS BY MOUTH DAILY FOR HIGH BLOOD PRESSURE       ALLERGIES     Patient has no known allergies.    FAMILY HISTORY       Family History   Problem Relation Age of Onset

## 2025-03-27 NOTE — ED TRIAGE NOTES
Patient arrives with c/o pain in left groin and left testicle x 4 days, with chills beginning yesterday.    States had colonoscopy two days ago. Denies having rectal pain, blood in stool.     Denies urinary changes, fever.    States has had similar left groin/testicle pain in the past without any confirmed diagnosis.

## 2025-03-27 NOTE — ED NOTES
Pt given discharge instructions, 0 prescriptions, and pt education. Pt instructed to follow-up w PCP & UROLOGY.  Pt verbalizes understanding and all questions answered. Pt ambulatory out of ER unaccompanied with steady gait. IV taken out prior to DC.

## 2025-06-05 ENCOUNTER — OFFICE VISIT (OUTPATIENT)
Age: 70
End: 2025-06-05
Payer: MEDICARE

## 2025-06-05 VITALS
HEART RATE: 71 BPM | OXYGEN SATURATION: 98 % | BODY MASS INDEX: 26.18 KG/M2 | WEIGHT: 204 LBS | HEIGHT: 74 IN | DIASTOLIC BLOOD PRESSURE: 72 MMHG | SYSTOLIC BLOOD PRESSURE: 118 MMHG

## 2025-06-05 DIAGNOSIS — J30.0 VASOMOTOR RHINITIS: ICD-10-CM

## 2025-06-05 DIAGNOSIS — R09.81 NASAL CONGESTION: Primary | ICD-10-CM

## 2025-06-05 PROCEDURE — 3078F DIAST BP <80 MM HG: CPT | Performed by: STUDENT IN AN ORGANIZED HEALTH CARE EDUCATION/TRAINING PROGRAM

## 2025-06-05 PROCEDURE — 3074F SYST BP LT 130 MM HG: CPT | Performed by: STUDENT IN AN ORGANIZED HEALTH CARE EDUCATION/TRAINING PROGRAM

## 2025-06-05 PROCEDURE — 1159F MED LIST DOCD IN RCRD: CPT | Performed by: STUDENT IN AN ORGANIZED HEALTH CARE EDUCATION/TRAINING PROGRAM

## 2025-06-05 PROCEDURE — 3017F COLORECTAL CA SCREEN DOC REV: CPT | Performed by: STUDENT IN AN ORGANIZED HEALTH CARE EDUCATION/TRAINING PROGRAM

## 2025-06-05 PROCEDURE — 1126F AMNT PAIN NOTED NONE PRSNT: CPT | Performed by: STUDENT IN AN ORGANIZED HEALTH CARE EDUCATION/TRAINING PROGRAM

## 2025-06-05 PROCEDURE — 1036F TOBACCO NON-USER: CPT | Performed by: STUDENT IN AN ORGANIZED HEALTH CARE EDUCATION/TRAINING PROGRAM

## 2025-06-05 PROCEDURE — 99204 OFFICE O/P NEW MOD 45 MIN: CPT | Performed by: STUDENT IN AN ORGANIZED HEALTH CARE EDUCATION/TRAINING PROGRAM

## 2025-06-05 PROCEDURE — 1123F ACP DISCUSS/DSCN MKR DOCD: CPT | Performed by: STUDENT IN AN ORGANIZED HEALTH CARE EDUCATION/TRAINING PROGRAM

## 2025-06-05 PROCEDURE — G8427 DOCREV CUR MEDS BY ELIG CLIN: HCPCS | Performed by: STUDENT IN AN ORGANIZED HEALTH CARE EDUCATION/TRAINING PROGRAM

## 2025-06-05 PROCEDURE — G8419 CALC BMI OUT NRM PARAM NOF/U: HCPCS | Performed by: STUDENT IN AN ORGANIZED HEALTH CARE EDUCATION/TRAINING PROGRAM

## 2025-06-05 RX ORDER — IPRATROPIUM BROMIDE 42 UG/1
2 SPRAY, METERED NASAL 4 TIMES DAILY PRN
Qty: 15 ML | Refills: 3 | Status: SHIPPED | OUTPATIENT
Start: 2025-06-05

## 2025-06-05 NOTE — PROGRESS NOTES
Subjective:   Hayden Garcia   69 y.o.   1955     Refered by: No referring provider defined for this encounter.     New Patient Visit  Chief Complaint: Rhinitis    History of Present Illness:  Hayden Garcia is a 69 y.o. male with past medical history of HTN, HLD, DM, RAHAT, who presents today for evaluation of chronic rhinitis.     Rhinitis - multiple times a day.  Frequently with eating.  Patient has been using Vicks nasal stick to help with the symptoms.    No allergy Hx.     No sinus pressure, no anosmia.     Patient's prior cardiac, renal, ED notes reviewed.    Review of Systems  Consitutional: denies fever, excessive weight gain or loss.  Eyes: denies diplopia, eye pain.  Integumentary: denies new concerning skin lesions.  Ears, Nose, Mouth, Throat: denies except as per HPI.  Endocrine: denies hot or cold intolerance, increased thirst.  Respiratory: denies cough, hemoptysis, wheezing  Gastrointestinal: denies trouble swallowing, nausea, emesis, regurgitation  Musculoskeletal: denies muscle weakness or wasting  Cardiovascular: denies chest pain, shortness of breath  Neurologic: denies seizures, numbness or tingling, syncope  Hematologic: denies easy bleeding or bruising       Past Medical History:   Diagnosis Date    Diabetes (HCC)     Hypercholesterolemia     Hypertension     Sleep apnea 2019     Past Surgical History:   Procedure Laterality Date    TONSILLECTOMY        Family History   Problem Relation Age of Onset    Cancer Father         Colon    Diabetes Paternal Aunt     Kidney Disease Brother     Sickle Cell Anemia Maternal Grandmother     Cancer Mother         Breast    Hypertension Mother      Social History     Tobacco Use    Smoking status: Never    Smokeless tobacco: Never    Tobacco comments:     N/A   Substance Use Topics    Alcohol use: No      Prior to Admission medications    Medication Sig Start Date End Date Taking? Authorizing Provider   ipratropium (ATROVENT) 0.06 % nasal spray 2 sprays by

## 2025-09-04 DIAGNOSIS — R09.81 NASAL CONGESTION: ICD-10-CM

## 2025-09-04 DIAGNOSIS — J30.0 VASOMOTOR RHINITIS: Primary | ICD-10-CM

## 2025-09-04 RX ORDER — IPRATROPIUM BROMIDE 42 UG/1
2 SPRAY, METERED NASAL 4 TIMES DAILY PRN
Qty: 15 ML | Refills: 3 | Status: SHIPPED | OUTPATIENT
Start: 2025-09-04